# Patient Record
Sex: FEMALE | Race: OTHER | HISPANIC OR LATINO
[De-identification: names, ages, dates, MRNs, and addresses within clinical notes are randomized per-mention and may not be internally consistent; named-entity substitution may affect disease eponyms.]

---

## 2017-07-02 ENCOUNTER — RESULT REVIEW (OUTPATIENT)
Age: 32
End: 2017-07-02

## 2017-07-02 ENCOUNTER — INPATIENT (INPATIENT)
Facility: HOSPITAL | Age: 32
LOS: 0 days | Discharge: HOME | End: 2017-07-03
Attending: OBSTETRICS & GYNECOLOGY | Admitting: OBSTETRICS & GYNECOLOGY
Payer: MEDICAID

## 2017-07-02 VITALS
DIASTOLIC BLOOD PRESSURE: 65 MMHG | TEMPERATURE: 99 F | SYSTOLIC BLOOD PRESSURE: 110 MMHG | RESPIRATION RATE: 18 BRPM | HEART RATE: 98 BPM | OXYGEN SATURATION: 100 %

## 2017-07-02 DIAGNOSIS — O00.90 UNSPECIFIED ECTOPIC PREGNANCY WITHOUT INTRAUTERINE PREGNANCY: ICD-10-CM

## 2017-07-02 LAB
ALBUMIN SERPL ELPH-MCNC: 4.4 G/DL — SIGNIFICANT CHANGE UP (ref 3.3–5)
ALP SERPL-CCNC: 32 U/L — LOW (ref 40–120)
ALT FLD-CCNC: 10 U/L — SIGNIFICANT CHANGE UP (ref 4–33)
APTT BLD: 20.7 SEC — LOW (ref 27.5–37.4)
APTT BLD: 24.2 SEC — LOW (ref 27.5–37.4)
APTT BLD: 25.4 SEC — LOW (ref 27.5–37.4)
AST SERPL-CCNC: 12 U/L — SIGNIFICANT CHANGE UP (ref 4–32)
BASE EXCESS BLDA CALC-SCNC: -5.5 MMOL/L — SIGNIFICANT CHANGE UP
BASE EXCESS BLDA CALC-SCNC: -6.1 MMOL/L — SIGNIFICANT CHANGE UP
BASOPHILS # BLD AUTO: 0.01 K/UL — SIGNIFICANT CHANGE UP (ref 0–0.2)
BASOPHILS # BLD AUTO: 0.02 K/UL — SIGNIFICANT CHANGE UP (ref 0–0.2)
BASOPHILS # BLD AUTO: 0.03 K/UL — SIGNIFICANT CHANGE UP (ref 0–0.2)
BASOPHILS NFR BLD AUTO: 0.1 % — SIGNIFICANT CHANGE UP (ref 0–2)
BASOPHILS NFR BLD AUTO: 0.2 % — SIGNIFICANT CHANGE UP (ref 0–2)
BASOPHILS NFR BLD AUTO: 0.2 % — SIGNIFICANT CHANGE UP (ref 0–2)
BILIRUB SERPL-MCNC: 0.4 MG/DL — SIGNIFICANT CHANGE UP (ref 0.2–1.2)
BLD GP AB SCN SERPL QL: NEGATIVE — SIGNIFICANT CHANGE UP
BUN SERPL-MCNC: 10 MG/DL — SIGNIFICANT CHANGE UP (ref 7–23)
CA-I BLDA-SCNC: 1.11 MMOL/L — LOW (ref 1.15–1.29)
CALCIUM SERPL-MCNC: 9.2 MG/DL — SIGNIFICANT CHANGE UP (ref 8.4–10.5)
CHLORIDE SERPL-SCNC: 98 MMOL/L — SIGNIFICANT CHANGE UP (ref 98–107)
CO2 SERPL-SCNC: 20 MMOL/L — LOW (ref 22–31)
CREAT SERPL-MCNC: 0.69 MG/DL — SIGNIFICANT CHANGE UP (ref 0.5–1.3)
EOSINOPHIL # BLD AUTO: 0 K/UL — SIGNIFICANT CHANGE UP (ref 0–0.5)
EOSINOPHIL NFR BLD AUTO: 0 % — SIGNIFICANT CHANGE UP (ref 0–6)
FIBRINOGEN PPP-MCNC: 336 MG/DL — SIGNIFICANT CHANGE UP (ref 310–510)
FIBRINOGEN PPP-MCNC: 368 MG/DL — SIGNIFICANT CHANGE UP (ref 310–510)
GLUCOSE BLDA-MCNC: 136 MG/DL — HIGH (ref 70–99)
GLUCOSE BLDA-MCNC: 95 MG/DL — SIGNIFICANT CHANGE UP (ref 70–99)
GLUCOSE SERPL-MCNC: 110 MG/DL — HIGH (ref 70–99)
HCG SERPL-ACNC: SIGNIFICANT CHANGE UP MIU/ML
HCO3 BLDA-SCNC: 19 MMOL/L — LOW (ref 22–26)
HCO3 BLDA-SCNC: 20 MMOL/L — LOW (ref 22–26)
HCT VFR BLD CALC: 24.9 % — LOW (ref 34.5–45)
HCT VFR BLD CALC: 26.2 % — LOW (ref 34.5–45)
HCT VFR BLD CALC: 27.4 % — LOW (ref 34.5–45)
HCT VFR BLDA CALC: 17.3 % — CRITICAL LOW (ref 34.5–46.5)
HCT VFR BLDA CALC: 27.1 % — LOW (ref 34.5–46.5)
HGB BLD-MCNC: 8.5 G/DL — LOW (ref 11.5–15.5)
HGB BLD-MCNC: 9.1 G/DL — LOW (ref 11.5–15.5)
HGB BLD-MCNC: 9.6 G/DL — LOW (ref 11.5–15.5)
HGB BLDA-MCNC: 5.5 G/DL — CRITICAL LOW (ref 11.5–15.5)
HGB BLDA-MCNC: 8.7 G/DL — LOW (ref 11.5–15.5)
IMM GRANULOCYTES # BLD AUTO: 0.04 # — SIGNIFICANT CHANGE UP
IMM GRANULOCYTES # BLD AUTO: 0.06 # — SIGNIFICANT CHANGE UP
IMM GRANULOCYTES # BLD AUTO: 0.07 # — SIGNIFICANT CHANGE UP
IMM GRANULOCYTES NFR BLD AUTO: 0.4 % — SIGNIFICANT CHANGE UP (ref 0–1.5)
IMM GRANULOCYTES NFR BLD AUTO: 0.5 % — SIGNIFICANT CHANGE UP (ref 0–1.5)
IMM GRANULOCYTES NFR BLD AUTO: 0.7 % — SIGNIFICANT CHANGE UP (ref 0–1.5)
INR BLD: 1.31 — HIGH (ref 0.88–1.17)
INR BLD: 1.34 — HIGH (ref 0.88–1.17)
INR BLD: 1.37 — HIGH (ref 0.88–1.17)
LYMPHOCYTES # BLD AUTO: 0.68 K/UL — LOW (ref 1–3.3)
LYMPHOCYTES # BLD AUTO: 0.71 K/UL — LOW (ref 1–3.3)
LYMPHOCYTES # BLD AUTO: 1.77 K/UL — SIGNIFICANT CHANGE UP (ref 1–3.3)
LYMPHOCYTES # BLD AUTO: 14.4 % — SIGNIFICANT CHANGE UP (ref 13–44)
LYMPHOCYTES # BLD AUTO: 6.6 % — LOW (ref 13–44)
LYMPHOCYTES # BLD AUTO: 7.5 % — LOW (ref 13–44)
MCHC RBC-ENTMCNC: 29.7 PG — SIGNIFICANT CHANGE UP (ref 27–34)
MCHC RBC-ENTMCNC: 31 PG — SIGNIFICANT CHANGE UP (ref 27–34)
MCHC RBC-ENTMCNC: 31 PG — SIGNIFICANT CHANGE UP (ref 27–34)
MCHC RBC-ENTMCNC: 34.1 % — SIGNIFICANT CHANGE UP (ref 32–36)
MCHC RBC-ENTMCNC: 34.7 % — SIGNIFICANT CHANGE UP (ref 32–36)
MCHC RBC-ENTMCNC: 35 % — SIGNIFICANT CHANGE UP (ref 32–36)
MCV RBC AUTO: 87.1 FL — SIGNIFICANT CHANGE UP (ref 80–100)
MCV RBC AUTO: 88.4 FL — SIGNIFICANT CHANGE UP (ref 80–100)
MCV RBC AUTO: 89.1 FL — SIGNIFICANT CHANGE UP (ref 80–100)
MONOCYTES # BLD AUTO: 0.18 K/UL — SIGNIFICANT CHANGE UP (ref 0–0.9)
MONOCYTES # BLD AUTO: 0.2 K/UL — SIGNIFICANT CHANGE UP (ref 0–0.9)
MONOCYTES # BLD AUTO: 0.55 K/UL — SIGNIFICANT CHANGE UP (ref 0–0.9)
MONOCYTES NFR BLD AUTO: 1.8 % — LOW (ref 2–14)
MONOCYTES NFR BLD AUTO: 2.1 % — SIGNIFICANT CHANGE UP (ref 2–14)
MONOCYTES NFR BLD AUTO: 4.5 % — SIGNIFICANT CHANGE UP (ref 2–14)
NEUTROPHILS # BLD AUTO: 8.49 K/UL — HIGH (ref 1.8–7.4)
NEUTROPHILS # BLD AUTO: 9.31 K/UL — HIGH (ref 1.8–7.4)
NEUTROPHILS # BLD AUTO: 9.87 K/UL — HIGH (ref 1.8–7.4)
NEUTROPHILS NFR BLD AUTO: 80.4 % — HIGH (ref 43–77)
NEUTROPHILS NFR BLD AUTO: 89.9 % — HIGH (ref 43–77)
NEUTROPHILS NFR BLD AUTO: 90.7 % — HIGH (ref 43–77)
NRBC # FLD: 0 — SIGNIFICANT CHANGE UP
PCO2 BLDA: 38 MMHG — SIGNIFICANT CHANGE UP (ref 32–48)
PCO2 BLDA: 39 MMHG — SIGNIFICANT CHANGE UP (ref 32–48)
PH BLDA: 7.32 PH — LOW (ref 7.35–7.45)
PH BLDA: 7.32 PH — LOW (ref 7.35–7.45)
PLATELET # BLD AUTO: 113 K/UL — LOW (ref 150–400)
PLATELET # BLD AUTO: 121 K/UL — LOW (ref 150–400)
PLATELET # BLD AUTO: 195 K/UL — SIGNIFICANT CHANGE UP (ref 150–400)
PMV BLD: 12.9 FL — SIGNIFICANT CHANGE UP (ref 7–13)
PMV BLD: 12.9 FL — SIGNIFICANT CHANGE UP (ref 7–13)
PMV BLD: 13.5 FL — HIGH (ref 7–13)
PO2 BLDA: 227 MMHG — HIGH (ref 83–108)
PO2 BLDA: 270 MMHG — HIGH (ref 83–108)
POTASSIUM BLDA-SCNC: 3.3 MMOL/L — LOW (ref 3.4–4.5)
POTASSIUM BLDA-SCNC: 3.8 MMOL/L — SIGNIFICANT CHANGE UP (ref 3.4–4.5)
POTASSIUM SERPL-MCNC: 3.6 MMOL/L — SIGNIFICANT CHANGE UP (ref 3.5–5.3)
POTASSIUM SERPL-SCNC: 3.6 MMOL/L — SIGNIFICANT CHANGE UP (ref 3.5–5.3)
PROT SERPL-MCNC: 7.1 G/DL — SIGNIFICANT CHANGE UP (ref 6–8.3)
PROTHROM AB SERPL-ACNC: 14.8 SEC — HIGH (ref 9.8–13.1)
PROTHROM AB SERPL-ACNC: 15.1 SEC — HIGH (ref 9.8–13.1)
PROTHROM AB SERPL-ACNC: 15.4 SEC — HIGH (ref 9.8–13.1)
RBC # BLD: 2.86 M/UL — LOW (ref 3.8–5.2)
RBC # BLD: 2.94 M/UL — LOW (ref 3.8–5.2)
RBC # BLD: 3.1 M/UL — LOW (ref 3.8–5.2)
RBC # FLD: 12.8 % — SIGNIFICANT CHANGE UP (ref 10.3–14.5)
RBC # FLD: 13.7 % — SIGNIFICANT CHANGE UP (ref 10.3–14.5)
RBC # FLD: 13.7 % — SIGNIFICANT CHANGE UP (ref 10.3–14.5)
RH IG SCN BLD-IMP: POSITIVE — SIGNIFICANT CHANGE UP
RH IG SCN BLD-IMP: POSITIVE — SIGNIFICANT CHANGE UP
SAO2 % BLDA: 99.6 % — HIGH (ref 95–99)
SAO2 % BLDA: 99.7 % — HIGH (ref 95–99)
SODIUM BLDA-SCNC: 133 MMOL/L — LOW (ref 136–146)
SODIUM BLDA-SCNC: 135 MMOL/L — LOW (ref 136–146)
SODIUM SERPL-SCNC: 138 MMOL/L — SIGNIFICANT CHANGE UP (ref 135–145)
WBC # BLD: 10.26 K/UL — SIGNIFICANT CHANGE UP (ref 3.8–10.5)
WBC # BLD: 12.28 K/UL — HIGH (ref 3.8–10.5)
WBC # BLD: 9.45 K/UL — SIGNIFICANT CHANGE UP (ref 3.8–10.5)
WBC # FLD AUTO: 10.26 K/UL — SIGNIFICANT CHANGE UP (ref 3.8–10.5)
WBC # FLD AUTO: 12.28 K/UL — HIGH (ref 3.8–10.5)
WBC # FLD AUTO: 9.45 K/UL — SIGNIFICANT CHANGE UP (ref 3.8–10.5)

## 2017-07-02 PROCEDURE — 88305 TISSUE EXAM BY PATHOLOGIST: CPT | Mod: 26

## 2017-07-02 PROCEDURE — 76830 TRANSVAGINAL US NON-OB: CPT | Mod: 26

## 2017-07-02 PROCEDURE — 88304 TISSUE EXAM BY PATHOLOGIST: CPT | Mod: 26

## 2017-07-02 PROCEDURE — 99221 1ST HOSP IP/OBS SF/LOW 40: CPT

## 2017-07-02 RX ORDER — HYDROMORPHONE HYDROCHLORIDE 2 MG/ML
1 INJECTION INTRAMUSCULAR; INTRAVENOUS; SUBCUTANEOUS ONCE
Qty: 0 | Refills: 0 | Status: DISCONTINUED | OUTPATIENT
Start: 2017-07-02 | End: 2017-07-02

## 2017-07-02 RX ORDER — ACETAMINOPHEN 500 MG
650 TABLET ORAL EVERY 6 HOURS
Qty: 0 | Refills: 0 | Status: DISCONTINUED | OUTPATIENT
Start: 2017-07-02 | End: 2017-07-03

## 2017-07-02 RX ORDER — MORPHINE SULFATE 50 MG/1
2 CAPSULE, EXTENDED RELEASE ORAL ONCE
Qty: 0 | Refills: 0 | Status: DISCONTINUED | OUTPATIENT
Start: 2017-07-02 | End: 2017-07-02

## 2017-07-02 RX ORDER — IBUPROFEN 200 MG
600 TABLET ORAL EVERY 6 HOURS
Qty: 0 | Refills: 0 | Status: DISCONTINUED | OUTPATIENT
Start: 2017-07-02 | End: 2017-07-03

## 2017-07-02 RX ORDER — OXYCODONE HYDROCHLORIDE 5 MG/1
10 TABLET ORAL EVERY 4 HOURS
Qty: 0 | Refills: 0 | Status: DISCONTINUED | OUTPATIENT
Start: 2017-07-02 | End: 2017-07-02

## 2017-07-02 RX ORDER — OXYCODONE HYDROCHLORIDE 5 MG/1
1 TABLET ORAL
Qty: 20 | Refills: 0 | OUTPATIENT
Start: 2017-07-02

## 2017-07-02 RX ORDER — SODIUM CHLORIDE 9 MG/ML
1000 INJECTION INTRAMUSCULAR; INTRAVENOUS; SUBCUTANEOUS ONCE
Qty: 0 | Refills: 0 | Status: COMPLETED | OUTPATIENT
Start: 2017-07-02 | End: 2017-07-02

## 2017-07-02 RX ORDER — MORPHINE SULFATE 50 MG/1
4 CAPSULE, EXTENDED RELEASE ORAL ONCE
Qty: 0 | Refills: 0 | Status: DISCONTINUED | OUTPATIENT
Start: 2017-07-02 | End: 2017-07-02

## 2017-07-02 RX ORDER — OXYCODONE HYDROCHLORIDE 5 MG/1
5 TABLET ORAL EVERY 4 HOURS
Qty: 0 | Refills: 0 | Status: DISCONTINUED | OUTPATIENT
Start: 2017-07-02 | End: 2017-07-03

## 2017-07-02 RX ORDER — SODIUM CHLORIDE 9 MG/ML
1000 INJECTION INTRAMUSCULAR; INTRAVENOUS; SUBCUTANEOUS
Qty: 0 | Refills: 0 | Status: DISCONTINUED | OUTPATIENT
Start: 2017-07-02 | End: 2017-07-03

## 2017-07-02 RX ORDER — SODIUM CHLORIDE 9 MG/ML
1000 INJECTION, SOLUTION INTRAVENOUS
Qty: 0 | Refills: 0 | Status: DISCONTINUED | OUTPATIENT
Start: 2017-07-02 | End: 2017-07-03

## 2017-07-02 RX ORDER — OXYCODONE HYDROCHLORIDE 5 MG/1
5 TABLET ORAL EVERY 4 HOURS
Qty: 0 | Refills: 0 | Status: DISCONTINUED | OUTPATIENT
Start: 2017-07-02 | End: 2017-07-02

## 2017-07-02 RX ORDER — ACETAMINOPHEN 500 MG
1000 TABLET ORAL ONCE
Qty: 0 | Refills: 0 | Status: DISCONTINUED | OUTPATIENT
Start: 2017-07-02 | End: 2017-07-02

## 2017-07-02 RX ADMIN — SODIUM CHLORIDE 125 MILLILITER(S): 9 INJECTION, SOLUTION INTRAVENOUS at 21:39

## 2017-07-02 RX ADMIN — MORPHINE SULFATE 4 MILLIGRAM(S): 50 CAPSULE, EXTENDED RELEASE ORAL at 03:04

## 2017-07-02 RX ADMIN — SODIUM CHLORIDE 1000 MILLILITER(S): 9 INJECTION INTRAMUSCULAR; INTRAVENOUS; SUBCUTANEOUS at 03:12

## 2017-07-02 RX ADMIN — Medication 600 MILLIGRAM(S): at 22:30

## 2017-07-02 RX ADMIN — SODIUM CHLORIDE 1000 MILLILITER(S): 9 INJECTION INTRAMUSCULAR; INTRAVENOUS; SUBCUTANEOUS at 04:41

## 2017-07-02 RX ADMIN — Medication 600 MILLIGRAM(S): at 21:39

## 2017-07-02 RX ADMIN — MORPHINE SULFATE 4 MILLIGRAM(S): 50 CAPSULE, EXTENDED RELEASE ORAL at 03:18

## 2017-07-02 NOTE — H&P ADULT - NSHPPHYSICALEXAM_GEN_ALL_CORE
ICU Vital Signs Last 24 Hrs  T(C): 37.3 (02 Jul 2017 01:55), Max: 37.3 (02 Jul 2017 01:55)  T(F): 99.2 (02 Jul 2017 01:55), Max: 99.2 (02 Jul 2017 01:55)  HR: 94 (02 Jul 2017 02:38) (94 - 98)  BP: 103/79 (02 Jul 2017 03:59) (93/61 - 110/65)  BP(mean): --  ABP: --  ABP(mean): --  RR: 18 (02 Jul 2017 02:38) (18 - 18)  SpO2: 100% (02 Jul 2017 02:38) (100% - 100%)    Gen: NAD  Abd: soft, tender to palpation, +rebound +guarding  Ext: NTBL

## 2017-07-02 NOTE — BRIEF OPERATIVE NOTE - POST-OP DX
Ectopic pregnancy  07/02/2017    Active  Shannon Matta
Ectopic pregnancy  07/02/2017    Active  Shannon Matta

## 2017-07-02 NOTE — H&P ADULT - PROBLEM SELECTOR PLAN 1
admit to GYN  preop labs  NPO  Rh+, no need for Rhogam  2 units pRBC on hold   add on to OR schedule for emergent LSC R salpingectomy    Pt seen w/ Dr. Oskar Matta MD PGY4

## 2017-07-02 NOTE — BRIEF OPERATIVE NOTE - OPERATION/FINDINGS
Intra-operative consult:  Diagnostic laparoscopic, evacuation of hematoma, no obvious bleeding.
ruptured left ectopic pregnancy  adhesions between ectopic pregnancy and omentum  adhesions between omentum and anterior abdominal wall  hemoperitoneum 2100cc

## 2017-07-02 NOTE — BRIEF OPERATIVE NOTE - PROCEDURE
Laparoscopy  07/02/2017    Active  Shannon Matta
Lysis of adhesions  07/02/2017    Active  STONICK  Laparoscopic left salpingectomy for ectopic pregnancy  07/02/2017    Active  STONICK  Laparoscopy  07/02/2017    Active  STONICK

## 2017-07-02 NOTE — PROGRESS NOTE ADULT - PROBLEM SELECTOR PLAN 1
1. Neuro: Analgesia PRN. acetaminophen  IVPB. 1000 milliGRAM(s) IV Intermittent once  HYDROmorphone  Injectable 1 milliGRAM(s) IV Push once  HYDROmorphone  Injectable 1 milliGRAM(s) IV Push once PRN     2. CV: Hemodynamically stable.  Monitor VS. Hct stable  3. Pulm: Saturating well on room air.  Encourage OOB and incentive spirometer use. Continue   4. GI: Advance to regular diet. Anti-emetics PRN.  5. : s/p void  6. Electrolytes: SLIV  7. DVT ppx w/ SCD's while in bed. Early ambulation, initially with assistance then as tolerated.    8. Discharge from PACU when criteria met.

## 2017-07-02 NOTE — ED ADULT TRIAGE NOTE - CHIEF COMPLAINT QUOTE
preg- vag bleed    approx 8 weeks preg as per pt...c/o pelvic pain and vag spotting. has not had sono yet...only +preg test

## 2017-07-02 NOTE — ED ADULT NURSE NOTE - OBJECTIVE STATEMENT
Break coverage RN received pt to room 6 A&Ox4 c/o being approx 8 weeks pregnant with vag bleeding, N/V, and abdominal cramping since this afternoon. Pt reports "pressure to umbilical/perineal area, noted to be uncomfortable on assessment. Pt reports this is 10th pregnancy, has had 5 live births, 2 abortions, and 2 miscarriages. IV placed, labs sent, VS as documented, will reassess.

## 2017-07-02 NOTE — ED PROVIDER NOTE - OBJECTIVE STATEMENT
32 year old female  8 weeks pregnant in with abdominal pain and vaginal bleeding, states started out with spotting, now says more severe bleeding, cannot characterize amount of pads.  Also with dysuria and pelvic pressure. Ha not had sono for this pregnancy yet. 32 year old female  8 weeks pregnant in with abdominal pain and vaginal bleeding, states started out with spotting, now says more severe bleeding, cannot characterize amount of pads.  Also with dysuria and pelvic pressure. Has not had sono for this pregnancy yet.

## 2017-07-02 NOTE — PROGRESS NOTE ADULT - SUBJECTIVE AND OBJECTIVE BOX
R2 OBGYN POST-OP CHECK  SUDHA PETERSON32y    Patient seen and evaluated at bedside.  Pt awake and alert resting comfortaby in bed.  Patient reports pain controlled with analgesia. Pt denies N/V, SOB, CP, palpitations, fever/chills. Tolerating clears.  Not OOB yet. Pt has not passed flatus, pt s/p void.     T(C): 36.9 (07-02-17 @ 15:00), Max: 36.9 (07-02-17 @ 14:00)  HR: 52 (07-02-17 @ 15:00) (52 - 60)  BP: 96/59 (07-02-17 @ 15:00) (95/54 - 100/66)  RR: 16 (07-02-17 @ 15:00) (15 - 16)  SpO2: 98% (07-02-17 @ 15:00) (98% - 99%)  Wt(kg): --  I&O's Summary    02 Jul 2017 07:01  -  02 Jul 2017 16:05  --------------------------------------------------------  IN: 905 mL / OUT: 865 mL / NET: 40 mL        Gen: Resting comfortably in bed, NAD  CV: S1S2, RRR  Lungs: CTA B/L  Abd: soft, appropriately tender, occasional BS x 4 quadrants.    Inc: Clean/dry/intact w/ bandage in place (3 port sites)  Ext: SCD's in place and functional, non-tender b/l, no edema    Labs:   07-02 @ 12:55:   9.45 > 8.5 / 24.9 < 113  07-02 @ 08:15:   10.26 > 9.1 / 26.2 < 121  07-02 @ 02:28:   12.28 > 9.6 / 27.4 < 195

## 2017-07-02 NOTE — ED PROVIDER NOTE - PHYSICAL EXAMINATION
pelvic exam chaperoned by Dr. Whatley- severe right adnexal tenderness, mild oozing of blood from cervix, cervix closed.  FAST exam shows free fluid in RUQ

## 2017-07-02 NOTE — H&P ADULT - NSHPLABSRESULTS_GEN_ALL_CORE
FAST scan + free fluid  Abd sono: ruptured right ectopic pregnancy (prelim)                          9.6    12.28 )-----------( 195      ( 02 Jul 2017 02:28 )             27.4   07-02    138  |  98  |  10  ----------------------------<  110<H>  3.6   |  20<L>  |  0.69    Ca    9.2      02 Jul 2017 02:28    TPro  7.1  /  Alb  4.4  /  TBili  0.4  /  DBili  x   /  AST  12  /  ALT  10  /  AlkPhos  32<L>  07-02  HCG Quantitative, Serum (07.02.17 @ 02:28)    HCG Quantitative, Serum: 38998: REFERENCE RANGE  GROUP                   HCG-QUANT mIU/mL  ------------------      ----------------  Male:                   Less than 5  Non-pregnant females:   Less than 5  Indeterminant females:            5 - 25    Weeks of pregnancy:           [id number]: U/mL  ------------------            -------  3                     6 -      71  4                    10 -     750  5                   220 -   7,100  6                   160 -  32,000  7                 3,700 - 164,000  8                32,000 - 150,703411: 00  9                64,000 - 151,000  10                47,000 - 187,000  12                28,000 - 211,000  14                14,000 -  63,000  15                12,000 -  71,000  16 - 18            8,000 -  58,000 mIU/mL

## 2017-07-02 NOTE — ED PROVIDER NOTE - ATTENDING CONTRIBUTION TO CARE
I, Mayi Gill M.D. have examined the patient and confirmed the essential components of the history, physical examination, diagnosis, and treatment plan. I agree with the patient's care as documented by the resident and amended herein by me. See note above for complete details of service.  31 yo F ~ 8 weeks pregnant with no prior US/PNC who pw progressive right lower abdominal pain and vaginal bleeding. Exam reveals significant R adnexal ttp and mild vaginal bleeding. Bedside US reveals fluid in Pitts's pouch. Pt hydrated, pain controlled and monitored closely. GYN cs placed for likely ruptured ectopic. Pt went for US (in ED rm 21) which confirmed ruptured R ectopic. Pt to OR for surgical mgmt. Pt aware of diagnosis and tx plan. Agrees with plan.

## 2017-07-02 NOTE — H&P ADULT - HISTORY OF PRESENT ILLNESS
31 yo  LMP 17 presents with abdominal pain. Pt began to have pain at 2:30pm, worsened overnight. Pt also having heavy vaginal bleeding at home and vomiting. Pt does not yet have prenatal care this pregnancy.    OBH:   2003 LTCS   2010   2011   2013   2015   2 SAB  2 TOP (1 D&C, 1 medication )    GYN: HPV  PMSH: c/s  All: NKDA  Med: none  Pt accepts blood products

## 2017-07-03 VITALS
DIASTOLIC BLOOD PRESSURE: 62 MMHG | TEMPERATURE: 98 F | HEART RATE: 66 BPM | OXYGEN SATURATION: 100 % | SYSTOLIC BLOOD PRESSURE: 95 MMHG | RESPIRATION RATE: 18 BRPM

## 2017-07-03 DIAGNOSIS — Z98.890 OTHER SPECIFIED POSTPROCEDURAL STATES: ICD-10-CM

## 2017-07-03 PROBLEM — Z00.00 ENCOUNTER FOR PREVENTIVE HEALTH EXAMINATION: Status: ACTIVE | Noted: 2017-07-03

## 2017-07-03 LAB
BASOPHILS # BLD AUTO: 0.01 K/UL — SIGNIFICANT CHANGE UP (ref 0–0.2)
BASOPHILS NFR BLD AUTO: 0.1 % — SIGNIFICANT CHANGE UP (ref 0–2)
EOSINOPHIL # BLD AUTO: 0.04 K/UL — SIGNIFICANT CHANGE UP (ref 0–0.5)
EOSINOPHIL NFR BLD AUTO: 0.6 % — SIGNIFICANT CHANGE UP (ref 0–6)
HCT VFR BLD CALC: 21.8 % — LOW (ref 34.5–45)
HCT VFR BLD CALC: 21.9 % — LOW (ref 34.5–45)
HGB BLD-MCNC: 7.5 G/DL — LOW (ref 11.5–15.5)
HGB BLD-MCNC: 7.6 G/DL — LOW (ref 11.5–15.5)
IMM GRANULOCYTES # BLD AUTO: 0.02 # — SIGNIFICANT CHANGE UP
IMM GRANULOCYTES NFR BLD AUTO: 0.3 % — SIGNIFICANT CHANGE UP (ref 0–1.5)
LYMPHOCYTES # BLD AUTO: 2.35 K/UL — SIGNIFICANT CHANGE UP (ref 1–3.3)
LYMPHOCYTES # BLD AUTO: 34.9 % — SIGNIFICANT CHANGE UP (ref 13–44)
MCHC RBC-ENTMCNC: 30.6 PG — SIGNIFICANT CHANGE UP (ref 27–34)
MCHC RBC-ENTMCNC: 31.3 PG — SIGNIFICANT CHANGE UP (ref 27–34)
MCHC RBC-ENTMCNC: 34.2 % — SIGNIFICANT CHANGE UP (ref 32–36)
MCHC RBC-ENTMCNC: 34.9 % — SIGNIFICANT CHANGE UP (ref 32–36)
MCV RBC AUTO: 89.4 FL — SIGNIFICANT CHANGE UP (ref 80–100)
MCV RBC AUTO: 89.7 FL — SIGNIFICANT CHANGE UP (ref 80–100)
MONOCYTES # BLD AUTO: 0.4 K/UL — SIGNIFICANT CHANGE UP (ref 0–0.9)
MONOCYTES NFR BLD AUTO: 5.9 % — SIGNIFICANT CHANGE UP (ref 2–14)
NEUTROPHILS # BLD AUTO: 3.91 K/UL — SIGNIFICANT CHANGE UP (ref 1.8–7.4)
NEUTROPHILS NFR BLD AUTO: 58.2 % — SIGNIFICANT CHANGE UP (ref 43–77)
NRBC # FLD: 0 — SIGNIFICANT CHANGE UP
NRBC # FLD: 0 — SIGNIFICANT CHANGE UP
PLATELET # BLD AUTO: 104 K/UL — LOW (ref 150–400)
PLATELET # BLD AUTO: 111 K/UL — LOW (ref 150–400)
PMV BLD: 13.1 FL — HIGH (ref 7–13)
PMV BLD: 13.6 FL — HIGH (ref 7–13)
RBC # BLD: 2.43 M/UL — LOW (ref 3.8–5.2)
RBC # BLD: 2.45 M/UL — LOW (ref 3.8–5.2)
RBC # FLD: 14 % — SIGNIFICANT CHANGE UP (ref 10.3–14.5)
RBC # FLD: 14 % — SIGNIFICANT CHANGE UP (ref 10.3–14.5)
WBC # BLD: 6.73 K/UL — SIGNIFICANT CHANGE UP (ref 3.8–10.5)
WBC # BLD: 7.6 K/UL — SIGNIFICANT CHANGE UP (ref 3.8–10.5)
WBC # FLD AUTO: 6.73 K/UL — SIGNIFICANT CHANGE UP (ref 3.8–10.5)
WBC # FLD AUTO: 7.6 K/UL — SIGNIFICANT CHANGE UP (ref 3.8–10.5)

## 2017-07-03 RX ORDER — DOCUSATE SODIUM 100 MG
100 CAPSULE ORAL DAILY
Qty: 0 | Refills: 0 | Status: DISCONTINUED | OUTPATIENT
Start: 2017-07-03 | End: 2017-07-03

## 2017-07-03 RX ORDER — ASCORBIC ACID 60 MG
500 TABLET,CHEWABLE ORAL DAILY
Qty: 0 | Refills: 0 | Status: DISCONTINUED | OUTPATIENT
Start: 2017-07-03 | End: 2017-07-03

## 2017-07-03 RX ORDER — FERROUS SULFATE 325(65) MG
1 TABLET ORAL
Qty: 90 | Refills: 0 | OUTPATIENT
Start: 2017-07-03 | End: 2017-08-02

## 2017-07-03 RX ORDER — ASCORBIC ACID 60 MG
1 TABLET,CHEWABLE ORAL
Qty: 30 | Refills: 0 | OUTPATIENT
Start: 2017-07-03 | End: 2017-08-02

## 2017-07-03 RX ORDER — ACETAMINOPHEN 500 MG
2 TABLET ORAL
Qty: 0 | Refills: 0 | COMMUNITY
Start: 2017-07-03

## 2017-07-03 RX ORDER — IBUPROFEN 200 MG
1 TABLET ORAL
Qty: 0 | Refills: 0 | COMMUNITY
Start: 2017-07-03

## 2017-07-03 RX ORDER — FERROUS SULFATE 325(65) MG
325 TABLET ORAL
Qty: 0 | Refills: 0 | Status: DISCONTINUED | OUTPATIENT
Start: 2017-07-03 | End: 2017-07-03

## 2017-07-03 RX ADMIN — Medication 325 MILLIGRAM(S): at 09:33

## 2017-07-03 NOTE — PROGRESS NOTE ADULT - ATTENDING COMMENTS
reviewed with patient discharge instructions and H/H was stable reviewed with patient discharge instructions and H/H was stable so patient was ready to be discharged

## 2017-07-03 NOTE — DISCHARGE NOTE ADULT - HOSPITAL COURSE
The patient was admitted for due to ruptured ectopic pregnancy. Pt had a Laparoscopic Left Salpingectomy, Lysis of Adhesions, evacuation of 2100 hemoperitoneum.  See operative note for details Pt received 2 units of PRBCS intraop. ON POD#1 pt had a drop in hematocrit due to acute blood loss 24.9->21.9 H/H was repeated and was stable. 21.9-> 21.8.  The patient reached all post-operative milestones. The patient is voiding spontaneously, tolerating regular diet, ambulating, afebrile and has stable vital signs prior to discharge

## 2017-07-03 NOTE — PROGRESS NOTE ADULT - PROBLEM SELECTOR PLAN 1
1. CVS: hemodynamically stable, f/u AM CBC   2. Pulm: no acute issue, Incentive Spirometry use encouraged  3. GI: Continue Regular Diet   4. : adequate UOP   5. Heme: venodynes for DVT Prophylaxis   6. Neuro: Analgesia PRN  7. Dispo: continue current management

## 2017-07-03 NOTE — PROGRESS NOTE ADULT - SUBJECTIVE AND OBJECTIVE BOX
DICK Progress note    S: Pt seen and examined at bedside.  Pt is ambulating. Pt is tolerating regular diet. Passing flatus. Pt denies fever, chills, chest pain, SOB, nausea, vomiting, lightheadedness, dizziness, LE pain.      O: T(F): 98.1 (07-03-17 @ 05:36), Max: 99 (07-02-17 @ 21:01)  HR: 72 (07-03-17 @ 05:36) (52 - 79)  BP: 101/58 (07-03-17 @ 05:36) (89/52 - 114/72)  RR: 18 (07-03-17 @ 05:36) (14 - 18)  SpO2: 100% (07-03-17 @ 05:36) (97% - 100%)  Wt(kg): --  I&O's Summary    02 Jul 2017 07:01  -  03 Jul 2017 06:59  --------------------------------------------------------  IN: 1905 mL / OUT: 2165 mL / NET: -260 mL      PHYSICAL EXAM:    Gen: NAD, alert and oriented x 3  Cardiovascular: regular rate and rhythm, S1 and S2 present   Respiratory: CTAB  Abd: Soft, non-tender, non-distended, no guarding, no rebound, + bowel sounds. Dressings removed from incision port sites, steri-strips in place.   Pelvic: Deferred  Ext: No tenderness b/l.         MEDICATIONS  (STANDING):  sodium chloride 0.9%. 1000 milliLiter(s) (125 mL/Hr) IV Continuous <Continuous>  lactated ringers. 1000 milliLiter(s) (125 mL/Hr) IV Continuous <Continuous>  ibuprofen  Tablet 600 milliGRAM(s) Oral every 6 hours    MEDICATIONS  (PRN):  acetaminophen   Tablet. 650 milliGRAM(s) Oral every 6 hours PRN Moderate Pain (4 - 6)  oxyCODONE IR 5 milliGRAM(s) Oral every 4 hours PRN Severe Pain (7 - 10)      LABS:                        7.5    7.60  )-----------( 104      ( 03 Jul 2017 05:10 )             21.9     07-02    138  |  98  |  10  ----------------------------<  110<H>  3.6   |  20<L>  |  0.69    Ca    9.2      02 Jul 2017 02:28    TPro  7.1  /  Alb  4.4  /  TBili  0.4  /  DBili  x   /  AST  12  /  ALT  10  /  AlkPhos  32<L>  07-02    PT/INR - ( 02 Jul 2017 12:25 )   PT: 14.8 SEC;   INR: 1.31          PTT - ( 02 Jul 2017 12:25 )  PTT:20.7 SEC      RADIOLOGY & ADDITIONAL TESTS:

## 2017-07-03 NOTE — PROGRESS NOTE ADULT - SUBJECTIVE AND OBJECTIVE BOX
OBGYJONES POD#   Note    S: Pt seen and examined at bedside. Pt states mild abdominal pain. Pt [ ] ambulating, tolerating ___ diet, [ ] flatus, [ ]BM, [ ] urinating adequately. Pt denies fever, chills, chest pain, SOB, nausea, vomiting, lightheadedness, dizziness.      O: T(F): 98.1 (07-03-17 @ 05:36), Max: 99 (07-02-17 @ 21:01)  HR: 72 (07-03-17 @ 05:36) (52 - 79)  BP: 101/58 (07-03-17 @ 05:36) (89/52 - 114/72)  RR: 18 (07-03-17 @ 05:36) (14 - 18)  SpO2: 100% (07-03-17 @ 05:36) (97% - 100%)  Wt(kg): --  I&O's Summary    02 Jul 2017 07:01  -  03 Jul 2017 06:48  --------------------------------------------------------  IN: 1905 mL / OUT: 2165 mL / NET: -260 mL      PHYSICAL EXAM:    Gen: NAD, alert and oriented x 3  Cardiovascular: regular rate and rhythm, S1 and S2 present   Respiratory: CTAB  Abd: Soft, mildly tender, [ ] distended, no guarding, no rebound, [ ] bowel sounds  Incision: clean, dry, intact  Pelvic: Deferred   Extremities: NTBL, venodynes on and functioning           MEDICATIONS  (STANDING):  sodium chloride 0.9%. 1000 milliLiter(s) (125 mL/Hr) IV Continuous <Continuous>  lactated ringers. 1000 milliLiter(s) (125 mL/Hr) IV Continuous <Continuous>  ibuprofen  Tablet 600 milliGRAM(s) Oral every 6 hours    MEDICATIONS  (PRN):  acetaminophen   Tablet. 650 milliGRAM(s) Oral every 6 hours PRN Moderate Pain (4 - 6)  oxyCODONE IR 5 milliGRAM(s) Oral every 4 hours PRN Severe Pain (7 - 10)      LABS:                        7.5    7.60  )-----------( 104      ( 03 Jul 2017 05:10 )             21.9     07-02    138  |  98  |  10  ----------------------------<  110<H>  3.6   |  20<L>  |  0.69    Ca    9.2      02 Jul 2017 02:28    TPro  7.1  /  Alb  4.4  /  TBili  0.4  /  DBili  x   /  AST  12  /  ALT  10  /  AlkPhos  32<L>  07-02    PT/INR - ( 02 Jul 2017 12:25 )   PT: 14.8 SEC;   INR: 1.31          PTT - ( 02 Jul 2017 12:25 )  PTT:20.7 SEC      RADIOLOGY & ADDITIONAL TESTS:

## 2017-07-03 NOTE — PROVIDER CONTACT NOTE (OTHER) - ASSESSMENT
pt asymptomatic, LR infusing at 125ml/hr. denies dizziness/lightheadedness, no s/s of acute distress noted

## 2017-07-03 NOTE — PROGRESS NOTE ADULT - PROBLEM SELECTOR PLAN 1
1. CVS: VS wnl and continue to monitor VS   2. Pulm: no acute issue, Incentive Spirometry use encouraged  3. GI: Continue Regular Diet   4. : adequate UOP   5. Heme: early ambulation for DVT prophylaxis   6. Neuro: Analgesia PRN  7. Dispo: will consider d/c if pt remains asymptomatic for anemia

## 2017-07-03 NOTE — DISCHARGE NOTE ADULT - INSTRUCTIONS
call md office if having temperature above 101,if not tolerating diet ,if having nausea or vomitting ,if incision site looks red ,swollen or discharge noted ,if having trouble urinating

## 2017-07-03 NOTE — DISCHARGE NOTE ADULT - MEDICATION SUMMARY - MEDICATIONS TO TAKE
I will START or STAY ON the medications listed below when I get home from the hospital:    oxyCODONE 5 mg oral tablet  -- 1 tab(s) by mouth every 6 hours, As Needed MDD:4 tabs  -- Caution federal law prohibits the transfer of this drug to any person other  than the person for whom it was prescribed.  It is very important that you take or use this exactly as directed.  Do not skip doses or discontinue unless directed by your doctor.  May cause drowsiness.  Alcohol may intensify this effect.  Use care when operating dangerous machinery.  This prescription cannot be refilled.  Using more of this medication than prescribed may cause serious breathing problems.    -- Indication: For Pain    ibuprofen 600 mg oral tablet  -- 1 tab(s) by mouth every 6 hours  -- Indication: For Pain    acetaminophen 325 mg oral tablet  -- 2 tab(s) by mouth every 6 hours, As needed, Moderate Pain (4 - 6)  -- Indication: For  pain    ferrous sulfate 325 mg oral tablet  -- 1 tab(s) by mouth 3 times a day  -- Check with your doctor before becoming pregnant.  Do not chew, break, or crush.  May discolor urine or feces.    -- Indication: For anemia    ascorbic acid 500 mg oral tablet  -- 1 tab(s) by mouth once a day  -- Indication: For anemia

## 2017-07-03 NOTE — DISCHARGE NOTE ADULT - CARE PROVIDER_API CALL
Bree Cooley), Obstetrics and Gynecology  [address] [place] [address]  Phone: [phone]  Fax: [phone]

## 2017-07-03 NOTE — DISCHARGE NOTE ADULT - CONDITIONS AT DISCHARGE
VSS, denies any distress,tolerating diet ,voiding without trouble ,lap site dry and intact ,iv discontinued,discharge instructions given pt verbalise understanding it

## 2017-07-03 NOTE — DISCHARGE NOTE ADULT - PATIENT PORTAL LINK FT
“You can access the [facility] Patient Portal, offered by [facility], by registering with the following website: [email or web address]”

## 2017-07-03 NOTE — DISCHARGE NOTE ADULT - CARE PLAN
Principal Discharge DX:	Ruptured ectopic pregnancy  Goal:	return to baseline  Instructions for follow-up, activity and diet:	Regular Diet, Activity as tolerated Call [phone] for 2 weeks postop appt. Principal Discharge DX:	Ruptured ectopic pregnancy  Goal:	return to baseline  Instructions for follow-up, activity and diet:	Regular Diet, Activity as tolerated Call [phone] for 2 weeks postop appt.

## 2017-07-04 ENCOUNTER — TRANSCRIPTION ENCOUNTER (OUTPATIENT)
Age: 32
End: 2017-07-04

## 2017-07-05 ENCOUNTER — TRANSCRIPTION ENCOUNTER (OUTPATIENT)
Age: 32
End: 2017-07-05

## 2017-07-18 ENCOUNTER — APPOINTMENT (OUTPATIENT)
Dept: OBGYN | Facility: HOSPITAL | Age: 32
End: 2017-07-18

## 2017-07-18 ENCOUNTER — OUTPATIENT (OUTPATIENT)
Dept: OUTPATIENT SERVICES | Facility: HOSPITAL | Age: 32
LOS: 1 days | End: 2017-07-18

## 2017-07-18 ENCOUNTER — LABORATORY RESULT (OUTPATIENT)
Age: 32
End: 2017-07-18

## 2017-07-18 VITALS
BODY MASS INDEX: 23.22 KG/M2 | HEIGHT: 61 IN | SYSTOLIC BLOOD PRESSURE: 87 MMHG | WEIGHT: 123 LBS | HEART RATE: 86 BPM | DIASTOLIC BLOOD PRESSURE: 53 MMHG

## 2017-07-18 DIAGNOSIS — Z09 ENCOUNTER FOR FOLLOW-UP EXAMINATION AFTER COMPLETED TREATMENT FOR CONDITIONS OTHER THAN MALIGNANT NEOPLASM: ICD-10-CM

## 2017-07-18 DIAGNOSIS — R51 HEADACHE: ICD-10-CM

## 2017-07-18 LAB
BASOPHILS # BLD AUTO: 0.06 K/UL — SIGNIFICANT CHANGE UP (ref 0–0.2)
BASOPHILS NFR BLD AUTO: 0.9 % — SIGNIFICANT CHANGE UP (ref 0–2)
EOSINOPHIL # BLD AUTO: 0.2 K/UL — SIGNIFICANT CHANGE UP (ref 0–0.5)
EOSINOPHIL NFR BLD AUTO: 3 % — SIGNIFICANT CHANGE UP (ref 0–6)
HCT VFR BLD CALC: 30 % — LOW (ref 34.5–45)
HGB BLD-MCNC: 9.8 G/DL — LOW (ref 11.5–15.5)
IMM GRANULOCYTES # BLD AUTO: 0.01 # — SIGNIFICANT CHANGE UP
IMM GRANULOCYTES NFR BLD AUTO: 0.2 % — SIGNIFICANT CHANGE UP (ref 0–1.5)
LYMPHOCYTES # BLD AUTO: 2.18 K/UL — SIGNIFICANT CHANGE UP (ref 1–3.3)
LYMPHOCYTES # BLD AUTO: 33.2 % — SIGNIFICANT CHANGE UP (ref 13–44)
MCHC RBC-ENTMCNC: 31 PG — SIGNIFICANT CHANGE UP (ref 27–34)
MCHC RBC-ENTMCNC: 32.7 % — SIGNIFICANT CHANGE UP (ref 32–36)
MCV RBC AUTO: 94.9 FL — SIGNIFICANT CHANGE UP (ref 80–100)
MONOCYTES # BLD AUTO: 0.34 K/UL — SIGNIFICANT CHANGE UP (ref 0–0.9)
MONOCYTES NFR BLD AUTO: 5.2 % — SIGNIFICANT CHANGE UP (ref 2–14)
NEUTROPHILS # BLD AUTO: 3.78 K/UL — SIGNIFICANT CHANGE UP (ref 1.8–7.4)
NEUTROPHILS NFR BLD AUTO: 57.5 % — SIGNIFICANT CHANGE UP (ref 43–77)
NRBC # FLD: 0 — SIGNIFICANT CHANGE UP
PLATELET # BLD AUTO: 299 K/UL — SIGNIFICANT CHANGE UP (ref 150–400)
PMV BLD: 11.8 FL — SIGNIFICANT CHANGE UP (ref 7–13)
RBC # BLD: 3.16 M/UL — LOW (ref 3.8–5.2)
RBC # FLD: 13.7 % — SIGNIFICANT CHANGE UP (ref 10.3–14.5)
WBC # BLD: 6.57 K/UL — SIGNIFICANT CHANGE UP (ref 3.8–10.5)
WBC # FLD AUTO: 6.57 K/UL — SIGNIFICANT CHANGE UP (ref 3.8–10.5)

## 2017-07-19 ENCOUNTER — TRANSCRIPTION ENCOUNTER (OUTPATIENT)
Age: 32
End: 2017-07-19

## 2017-07-19 DIAGNOSIS — Z09 ENCOUNTER FOR FOLLOW-UP EXAMINATION AFTER COMPLETED TREATMENT FOR CONDITIONS OTHER THAN MALIGNANT NEOPLASM: ICD-10-CM

## 2017-07-24 ENCOUNTER — CHART COPY (OUTPATIENT)
Age: 32
End: 2017-07-24

## 2017-09-21 ENCOUNTER — EMERGENCY (EMERGENCY)
Facility: HOSPITAL | Age: 32
LOS: 1 days | Discharge: HOME | End: 2017-09-21
Attending: EMERGENCY MEDICINE | Admitting: EMERGENCY MEDICINE
Payer: MEDICAID

## 2017-09-21 VITALS
OXYGEN SATURATION: 100 % | DIASTOLIC BLOOD PRESSURE: 106 MMHG | TEMPERATURE: 99 F | RESPIRATION RATE: 20 BRPM | SYSTOLIC BLOOD PRESSURE: 125 MMHG | HEART RATE: 86 BPM

## 2017-09-21 VITALS
SYSTOLIC BLOOD PRESSURE: 115 MMHG | DIASTOLIC BLOOD PRESSURE: 64 MMHG | OXYGEN SATURATION: 100 % | HEART RATE: 92 BPM | RESPIRATION RATE: 18 BRPM

## 2017-09-21 DIAGNOSIS — Z90.79 ACQUIRED ABSENCE OF OTHER GENITAL ORGAN(S): Chronic | ICD-10-CM

## 2017-09-21 DIAGNOSIS — Z98.891 HISTORY OF UTERINE SCAR FROM PREVIOUS SURGERY: Chronic | ICD-10-CM

## 2017-09-21 LAB
ALBUMIN SERPL ELPH-MCNC: 4.7 G/DL — SIGNIFICANT CHANGE UP (ref 3.3–5)
ALP SERPL-CCNC: 38 U/L — LOW (ref 40–120)
ALT FLD-CCNC: 26 U/L — SIGNIFICANT CHANGE UP (ref 4–33)
APPEARANCE UR: CLEAR — SIGNIFICANT CHANGE UP
AST SERPL-CCNC: 25 U/L — SIGNIFICANT CHANGE UP (ref 4–32)
BACTERIA # UR AUTO: SIGNIFICANT CHANGE UP
BASOPHILS # BLD AUTO: 0.02 K/UL — SIGNIFICANT CHANGE UP (ref 0–0.2)
BASOPHILS NFR BLD AUTO: 0.2 % — SIGNIFICANT CHANGE UP (ref 0–2)
BILIRUB SERPL-MCNC: 0.3 MG/DL — SIGNIFICANT CHANGE UP (ref 0.2–1.2)
BILIRUB UR-MCNC: NEGATIVE — SIGNIFICANT CHANGE UP
BLOOD UR QL VISUAL: NEGATIVE — SIGNIFICANT CHANGE UP
BUN SERPL-MCNC: 7 MG/DL — SIGNIFICANT CHANGE UP (ref 7–23)
CALCIUM SERPL-MCNC: 9.2 MG/DL — SIGNIFICANT CHANGE UP (ref 8.4–10.5)
CHLORIDE SERPL-SCNC: 103 MMOL/L — SIGNIFICANT CHANGE UP (ref 98–107)
CO2 SERPL-SCNC: 17 MMOL/L — LOW (ref 22–31)
COLOR SPEC: YELLOW — SIGNIFICANT CHANGE UP
CREAT SERPL-MCNC: 0.6 MG/DL — SIGNIFICANT CHANGE UP (ref 0.5–1.3)
EOSINOPHIL # BLD AUTO: 0 K/UL — SIGNIFICANT CHANGE UP (ref 0–0.5)
EOSINOPHIL NFR BLD AUTO: 0 % — SIGNIFICANT CHANGE UP (ref 0–6)
GLUCOSE SERPL-MCNC: 128 MG/DL — HIGH (ref 70–99)
GLUCOSE UR-MCNC: NEGATIVE — SIGNIFICANT CHANGE UP
HCG SERPL-ACNC: SIGNIFICANT CHANGE UP MIU/ML
HCT VFR BLD CALC: 34.4 % — LOW (ref 34.5–45)
HGB BLD-MCNC: 12.4 G/DL — SIGNIFICANT CHANGE UP (ref 11.5–15.5)
IMM GRANULOCYTES # BLD AUTO: 0.03 # — SIGNIFICANT CHANGE UP
IMM GRANULOCYTES NFR BLD AUTO: 0.3 % — SIGNIFICANT CHANGE UP (ref 0–1.5)
KETONES UR-MCNC: SIGNIFICANT CHANGE UP
LEUKOCYTE ESTERASE UR-ACNC: NEGATIVE — SIGNIFICANT CHANGE UP
LYMPHOCYTES # BLD AUTO: 1.14 K/UL — SIGNIFICANT CHANGE UP (ref 1–3.3)
LYMPHOCYTES # BLD AUTO: 11 % — LOW (ref 13–44)
MAGNESIUM SERPL-MCNC: 1.8 MG/DL — SIGNIFICANT CHANGE UP (ref 1.6–2.6)
MCHC RBC-ENTMCNC: 31.2 PG — SIGNIFICANT CHANGE UP (ref 27–34)
MCHC RBC-ENTMCNC: 36 % — SIGNIFICANT CHANGE UP (ref 32–36)
MCV RBC AUTO: 86.4 FL — SIGNIFICANT CHANGE UP (ref 80–100)
MONOCYTES # BLD AUTO: 0.26 K/UL — SIGNIFICANT CHANGE UP (ref 0–0.9)
MONOCYTES NFR BLD AUTO: 2.5 % — SIGNIFICANT CHANGE UP (ref 2–14)
MUCOUS THREADS # UR AUTO: SIGNIFICANT CHANGE UP
NEUTROPHILS # BLD AUTO: 8.89 K/UL — HIGH (ref 1.8–7.4)
NEUTROPHILS NFR BLD AUTO: 86 % — HIGH (ref 43–77)
NITRITE UR-MCNC: NEGATIVE — SIGNIFICANT CHANGE UP
NRBC # FLD: 0 — SIGNIFICANT CHANGE UP
PH UR: 6 — SIGNIFICANT CHANGE UP (ref 4.6–8)
PHOSPHATE SERPL-MCNC: 3.1 MG/DL — SIGNIFICANT CHANGE UP (ref 2.5–4.5)
PLATELET # BLD AUTO: 220 K/UL — SIGNIFICANT CHANGE UP (ref 150–400)
PMV BLD: 12.9 FL — SIGNIFICANT CHANGE UP (ref 7–13)
POTASSIUM SERPL-MCNC: 3.5 MMOL/L — SIGNIFICANT CHANGE UP (ref 3.5–5.3)
POTASSIUM SERPL-SCNC: 3.5 MMOL/L — SIGNIFICANT CHANGE UP (ref 3.5–5.3)
PROT SERPL-MCNC: 7.6 G/DL — SIGNIFICANT CHANGE UP (ref 6–8.3)
PROT UR-MCNC: 20 — SIGNIFICANT CHANGE UP
RBC # BLD: 3.98 M/UL — SIGNIFICANT CHANGE UP (ref 3.8–5.2)
RBC # FLD: 13.1 % — SIGNIFICANT CHANGE UP (ref 10.3–14.5)
RBC CASTS # UR COMP ASSIST: SIGNIFICANT CHANGE UP (ref 0–?)
SODIUM SERPL-SCNC: 139 MMOL/L — SIGNIFICANT CHANGE UP (ref 135–145)
SP GR SPEC: 1.03 — SIGNIFICANT CHANGE UP (ref 1–1.03)
SQUAMOUS # UR AUTO: SIGNIFICANT CHANGE UP
UROBILINOGEN FLD QL: NORMAL E.U. — SIGNIFICANT CHANGE UP (ref 0.1–0.2)
WBC # BLD: 10.34 K/UL — SIGNIFICANT CHANGE UP (ref 3.8–10.5)
WBC # FLD AUTO: 10.34 K/UL — SIGNIFICANT CHANGE UP (ref 3.8–10.5)
WBC UR QL: SIGNIFICANT CHANGE UP (ref 0–?)

## 2017-09-21 PROCEDURE — 99291 CRITICAL CARE FIRST HOUR: CPT

## 2017-09-21 PROCEDURE — 76830 TRANSVAGINAL US NON-OB: CPT | Mod: 26

## 2017-09-21 RX ORDER — SODIUM CHLORIDE 9 MG/ML
1000 INJECTION INTRAMUSCULAR; INTRAVENOUS; SUBCUTANEOUS ONCE
Qty: 0 | Refills: 0 | Status: COMPLETED | OUTPATIENT
Start: 2017-09-21 | End: 2017-09-21

## 2017-09-21 RX ORDER — MORPHINE SULFATE 50 MG/1
4 CAPSULE, EXTENDED RELEASE ORAL ONCE
Qty: 0 | Refills: 0 | Status: DISCONTINUED | OUTPATIENT
Start: 2017-09-21 | End: 2017-09-21

## 2017-09-21 RX ORDER — METOCLOPRAMIDE HCL 10 MG
10 TABLET ORAL ONCE
Qty: 0 | Refills: 0 | Status: COMPLETED | OUTPATIENT
Start: 2017-09-21 | End: 2017-09-21

## 2017-09-21 RX ORDER — METOCLOPRAMIDE HCL 10 MG
1 TABLET ORAL
Qty: 20 | Refills: 0 | OUTPATIENT
Start: 2017-09-21 | End: 2017-09-26

## 2017-09-21 RX ORDER — ONDANSETRON 8 MG/1
4 TABLET, FILM COATED ORAL ONCE
Qty: 0 | Refills: 0 | Status: COMPLETED | OUTPATIENT
Start: 2017-09-21 | End: 2017-09-21

## 2017-09-21 RX ADMIN — SODIUM CHLORIDE 1000 MILLILITER(S): 9 INJECTION INTRAMUSCULAR; INTRAVENOUS; SUBCUTANEOUS at 02:31

## 2017-09-21 RX ADMIN — Medication 10 MILLIGRAM(S): at 03:02

## 2017-09-21 RX ADMIN — SODIUM CHLORIDE 1000 MILLILITER(S): 9 INJECTION INTRAMUSCULAR; INTRAVENOUS; SUBCUTANEOUS at 03:49

## 2017-09-21 RX ADMIN — ONDANSETRON 4 MILLIGRAM(S): 8 TABLET, FILM COATED ORAL at 02:32

## 2017-09-21 RX ADMIN — MORPHINE SULFATE 4 MILLIGRAM(S): 50 CAPSULE, EXTENDED RELEASE ORAL at 03:02

## 2017-09-21 NOTE — ED ADULT NURSE NOTE - OBJECTIVE STATEMENT
Pt received in #24, aaox3 with c/o b/l lower abd pain, nausea and vomiting. States she had a positive home pregnancy test and "I did the calculation and I am about 6 weeks pregnant". States she had a hx of ectopic pregnancy in July 2017. Denies vaginal bleeding, vaginal discharge, fever, or chills.

## 2017-09-21 NOTE — ED PROVIDER NOTE - OBJECTIVE STATEMENT
31 yo F  previous ectopic on left leading to salpingectomy here at [facility] that presents with severe abd pain, N/V x 1 day found out pregnant a few days ago from home preg test, has not seen MD for this pregnancy. LMP Aug 27, 2017. No urinary symptoms, no abnormal food intakes. Denies chest pain, SOB, falls, trauma, travel, rash. She has not ad fevers, chills, this is possible feeling same as previous ectopic.

## 2017-09-21 NOTE — ED PROVIDER NOTE - DIAGNOSIS COUNSELING, MDM
conducted a detailed discussion... please followup with your OBGYN outpt. Take the Rx'd prenatal vitamins during your pregnancy. Return for worsening symptoms.

## 2017-09-21 NOTE — ED PROVIDER NOTE - MEDICAL DECISION MAKING DETAILS
33 yo  LMP Aug 27 that presents with abd pain, N/V x 1 day. Worry for ectopic given history of ectopic. Will obtain emergent US and labs and provide meds and fluids and reassess. 33 yo  LMP Aug 27 that presents with abd pain, N/V x 1 day. Worry for ectopic given history of ectopic. Will obtain emergent US and labs and provide meds and fluids and reassess.  UA neg. Blood type reviewed from previous chart shows B POS and no reports vaginal bleed so no need for Rhogam. HCG 63K and US shows IUP so no ectopic. Will send home, f/u with PMD and OBGYN, list of our OBGYN given. Rx for prenatal vitamins.

## 2017-09-21 NOTE — ED ADULT NURSE NOTE - CHIEF COMPLAINT QUOTE
pt. approximately 6 wks pregnant c/o lower abd pain, nausea and numerous episodes of non-bloody emesis since this AM.  Denies vaginal bleeding/discharge.  LMP 08/06/17.  PMHx ectopic pregnancy.    Addendum: Pt. continuously vomiting in triage, appears very uncomfortable, 20G IV saline lock placed in the L AC, labs drawn and sent as ordered, pt. medicated as ordered by MD Camara.

## 2017-09-21 NOTE — ED ADULT TRIAGE NOTE - CHIEF COMPLAINT QUOTE
pt. approximately 6 wks pregnant c/o lower abd pain, nausea and numerous episodes of non-bloody emesis since this AM.  Denies vaginal bleeding/discharge.  LMP 08/06/17.  PMHx ectopic pregnancy. pt. approximately 6 wks pregnant c/o lower abd pain, nausea and numerous episodes of non-bloody emesis since this AM.  Denies vaginal bleeding/discharge.  LMP 08/06/17.  PMHx ectopic pregnancy.    Addendum: Pt. actively vomiting in triage, appears very uncomfortable, 20G IV saline lock placed in the L AC, labs drawn and sent as ordered, pt. medicated as ordered by MD Camara. pt. approximately 6 wks pregnant c/o lower abd pain, nausea and numerous episodes of non-bloody emesis since this AM.  Denies vaginal bleeding/discharge.  LMP 08/06/17.  PMHx ectopic pregnancy.    Addendum: Pt. continuously vomiting in triage, appears very uncomfortable, 20G IV saline lock placed in the L AC, labs drawn and sent as ordered, pt. medicated as ordered by MD Camara.

## 2017-09-22 ENCOUNTER — EMERGENCY (EMERGENCY)
Facility: HOSPITAL | Age: 32
LOS: 1 days | Discharge: HOME | End: 2017-09-22
Attending: EMERGENCY MEDICINE | Admitting: EMERGENCY MEDICINE
Payer: MEDICAID

## 2017-09-22 VITALS
TEMPERATURE: 98 F | SYSTOLIC BLOOD PRESSURE: 100 MMHG | DIASTOLIC BLOOD PRESSURE: 67 MMHG | RESPIRATION RATE: 18 BRPM | HEART RATE: 67 BPM | OXYGEN SATURATION: 100 %

## 2017-09-22 DIAGNOSIS — Z90.79 ACQUIRED ABSENCE OF OTHER GENITAL ORGAN(S): Chronic | ICD-10-CM

## 2017-09-22 DIAGNOSIS — Z98.891 HISTORY OF UTERINE SCAR FROM PREVIOUS SURGERY: Chronic | ICD-10-CM

## 2017-09-22 LAB
ALBUMIN SERPL ELPH-MCNC: 5.1 G/DL — HIGH (ref 3.3–5)
ALP SERPL-CCNC: 39 U/L — LOW (ref 40–120)
ALT FLD-CCNC: 40 U/L — HIGH (ref 4–33)
APTT BLD: 24.7 SEC — LOW (ref 27.5–37.4)
AST SERPL-CCNC: 25 U/L — SIGNIFICANT CHANGE UP (ref 4–32)
BACTERIA UR CULT: SIGNIFICANT CHANGE UP
BASOPHILS # BLD AUTO: 0.03 K/UL — SIGNIFICANT CHANGE UP (ref 0–0.2)
BASOPHILS NFR BLD AUTO: 0.3 % — SIGNIFICANT CHANGE UP (ref 0–2)
BILIRUB SERPL-MCNC: 0.5 MG/DL — SIGNIFICANT CHANGE UP (ref 0.2–1.2)
BLD GP AB SCN SERPL QL: NEGATIVE — SIGNIFICANT CHANGE UP
BUN SERPL-MCNC: 8 MG/DL — SIGNIFICANT CHANGE UP (ref 7–23)
CALCIUM SERPL-MCNC: 9.6 MG/DL — SIGNIFICANT CHANGE UP (ref 8.4–10.5)
CHLORIDE SERPL-SCNC: 99 MMOL/L — SIGNIFICANT CHANGE UP (ref 98–107)
CO2 SERPL-SCNC: 17 MMOL/L — LOW (ref 22–31)
CREAT SERPL-MCNC: 0.59 MG/DL — SIGNIFICANT CHANGE UP (ref 0.5–1.3)
EOSINOPHIL # BLD AUTO: 0 K/UL — SIGNIFICANT CHANGE UP (ref 0–0.5)
EOSINOPHIL NFR BLD AUTO: 0 % — SIGNIFICANT CHANGE UP (ref 0–6)
GLUCOSE SERPL-MCNC: 104 MG/DL — HIGH (ref 70–99)
HCG SERPL-ACNC: SIGNIFICANT CHANGE UP MIU/ML
HCT VFR BLD CALC: 34.8 % — SIGNIFICANT CHANGE UP (ref 34.5–45)
HGB BLD-MCNC: 12.5 G/DL — SIGNIFICANT CHANGE UP (ref 11.5–15.5)
IMM GRANULOCYTES # BLD AUTO: 0.05 # — SIGNIFICANT CHANGE UP
IMM GRANULOCYTES NFR BLD AUTO: 0.5 % — SIGNIFICANT CHANGE UP (ref 0–1.5)
INR BLD: 1.14 — SIGNIFICANT CHANGE UP (ref 0.88–1.17)
LYMPHOCYTES # BLD AUTO: 1.5 K/UL — SIGNIFICANT CHANGE UP (ref 1–3.3)
LYMPHOCYTES # BLD AUTO: 14.8 % — SIGNIFICANT CHANGE UP (ref 13–44)
MCHC RBC-ENTMCNC: 31.3 PG — SIGNIFICANT CHANGE UP (ref 27–34)
MCHC RBC-ENTMCNC: 35.9 % — SIGNIFICANT CHANGE UP (ref 32–36)
MCV RBC AUTO: 87 FL — SIGNIFICANT CHANGE UP (ref 80–100)
MONOCYTES # BLD AUTO: 0.52 K/UL — SIGNIFICANT CHANGE UP (ref 0–0.9)
MONOCYTES NFR BLD AUTO: 5.1 % — SIGNIFICANT CHANGE UP (ref 2–14)
NEUTROPHILS # BLD AUTO: 8.04 K/UL — HIGH (ref 1.8–7.4)
NEUTROPHILS NFR BLD AUTO: 79.3 % — HIGH (ref 43–77)
NRBC # FLD: 0 — SIGNIFICANT CHANGE UP
PLATELET # BLD AUTO: 233 K/UL — SIGNIFICANT CHANGE UP (ref 150–400)
PMV BLD: 13.3 FL — HIGH (ref 7–13)
POTASSIUM SERPL-MCNC: 3.2 MMOL/L — LOW (ref 3.5–5.3)
POTASSIUM SERPL-SCNC: 3.2 MMOL/L — LOW (ref 3.5–5.3)
PROT SERPL-MCNC: 8.1 G/DL — SIGNIFICANT CHANGE UP (ref 6–8.3)
PROTHROM AB SERPL-ACNC: 12.8 SEC — SIGNIFICANT CHANGE UP (ref 9.8–13.1)
RBC # BLD: 4 M/UL — SIGNIFICANT CHANGE UP (ref 3.8–5.2)
RBC # FLD: 13.2 % — SIGNIFICANT CHANGE UP (ref 10.3–14.5)
RH IG SCN BLD-IMP: POSITIVE — SIGNIFICANT CHANGE UP
SODIUM SERPL-SCNC: 138 MMOL/L — SIGNIFICANT CHANGE UP (ref 135–145)
SPECIMEN SOURCE: SIGNIFICANT CHANGE UP
WBC # BLD: 10.14 K/UL — SIGNIFICANT CHANGE UP (ref 3.8–10.5)
WBC # FLD AUTO: 10.14 K/UL — SIGNIFICANT CHANGE UP (ref 3.8–10.5)

## 2017-09-22 PROCEDURE — 74181 MRI ABDOMEN W/O CONTRAST: CPT | Mod: 26

## 2017-09-22 PROCEDURE — 99285 EMERGENCY DEPT VISIT HI MDM: CPT

## 2017-09-22 PROCEDURE — 72195 MRI PELVIS W/O DYE: CPT | Mod: 26

## 2017-09-22 RX ORDER — MORPHINE SULFATE 50 MG/1
4 CAPSULE, EXTENDED RELEASE ORAL ONCE
Qty: 0 | Refills: 0 | Status: DISCONTINUED | OUTPATIENT
Start: 2017-09-22 | End: 2017-09-22

## 2017-09-22 RX ORDER — FAMOTIDINE 10 MG/ML
20 INJECTION INTRAVENOUS ONCE
Qty: 0 | Refills: 0 | Status: COMPLETED | OUTPATIENT
Start: 2017-09-22 | End: 2017-09-22

## 2017-09-22 RX ORDER — ONDANSETRON 8 MG/1
4 TABLET, FILM COATED ORAL ONCE
Qty: 0 | Refills: 0 | Status: COMPLETED | OUTPATIENT
Start: 2017-09-22 | End: 2017-09-22

## 2017-09-22 RX ORDER — SODIUM CHLORIDE 9 MG/ML
1000 INJECTION INTRAMUSCULAR; INTRAVENOUS; SUBCUTANEOUS ONCE
Qty: 0 | Refills: 0 | Status: COMPLETED | OUTPATIENT
Start: 2017-09-22 | End: 2017-09-22

## 2017-09-22 RX ADMIN — ONDANSETRON 4 MILLIGRAM(S): 8 TABLET, FILM COATED ORAL at 16:08

## 2017-09-22 RX ADMIN — MORPHINE SULFATE 4 MILLIGRAM(S): 50 CAPSULE, EXTENDED RELEASE ORAL at 17:30

## 2017-09-22 RX ADMIN — MORPHINE SULFATE 4 MILLIGRAM(S): 50 CAPSULE, EXTENDED RELEASE ORAL at 17:06

## 2017-09-22 RX ADMIN — SODIUM CHLORIDE 2000 MILLILITER(S): 9 INJECTION INTRAMUSCULAR; INTRAVENOUS; SUBCUTANEOUS at 16:05

## 2017-09-22 RX ADMIN — FAMOTIDINE 20 MILLIGRAM(S): 10 INJECTION INTRAVENOUS at 16:54

## 2017-09-22 NOTE — ED PROVIDER NOTE - ATTENDING CONTRIBUTION TO CARE
I, Jennifer Cabot, MD, have performed a history and physical exam of the patient and discussed their management with the resident. I reviewed the resident's note and agree with the documented findings and plan of care. My medical decision making and observations are found above.    Cabot: 32F multiparous, with history of multiple c sections and laparoscopic L ectopic resection, now 7 weeks pregnant, back with N/V and severe LLQ pain x 2 days.  Denies flatus and BMs x several days.  On exam, HDS and VSS but with scaphoid abd, TTP in the LLQ with guarding, no rebound, no CVAT.  DDx includes constipation, viral illness, hyperemesis of pregnancy, less likely SBO, diverticulitis.  Will perform MRI without contrast.  Will also give antiemetics, fluids, and pain control.

## 2017-09-22 NOTE — ED PROVIDER NOTE - PLAN OF CARE
You were seen in the ER for abdominal pain. You must follow up with your primary physician in 24 to 48 hours. Return to the ER for any new or worsening signs/symptoms.   1) Take Tylenol (acetaminophen) 1000 mg every 6 to 8 hours as needed for pain with a daily maximal dose of 3000 mg.  2) You must follow up with your obstetrician in 24 to 48 hours.

## 2017-09-22 NOTE — ED PROVIDER NOTE - CHIEF COMPLAINT
The patient is a 32y Female complaining of The patient is a 32y Female complaining of abdominal pain and vomiting.

## 2017-09-22 NOTE — ED ADULT NURSE NOTE - ED STAT RN HANDOFF DETAILS
pt. admitted to CDU. pt. feeling nauseated, c/o pain on LLQ. MD aware. meds given as ordered. report given to BHAVNA Orantes.

## 2017-09-22 NOTE — ED ADULT NURSE NOTE - OBJECTIVE STATEMENT
31 y/o female presents to ED with c/o LLQ pain.  Pt , currently 7 weeks pregnant with c/o LLQ pain, pt seen in ED for same yesterday dx with ovarian cyst.  Pt states that her pain is worse accompanied by nausea and vomiting 33 y/o female presents to ED with c/o LLQ pain.  Pt , currently 7 weeks pregnant with c/o LLQ pain, pt seen in ED for same yesterday dx with ovarian cyst.  Pt states that her pain is worse accompanied by nausea and vomiting, unable to tolerate PO 31 y/o female presents to ED with c/o LLQ pain.  Pt , currently 7 weeks pregnant with c/o LLQ pain, pt seen in ED for same yesterday dx with ovarian cyst.  Pt states that her pain is worse accompanied by nausea and vomiting, unable to tolerate PO.  Pt denies fever chills, states that she has some light pink vaginal spotting.  No cough, no CP SOB.  IV established, labs drawn and sent.  Medicated as per order, will cont to monitor.

## 2017-09-22 NOTE — ED PROVIDER NOTE - OBJECTIVE STATEMENT
32 y.o. s/p laparoscopic removal of ectopic pregnancy June 2017 p/w lower abdominal pain and vomiting x 2 days.  She came to [facility] ED 2 days ago with the same symptoms, which resolved after antiemetics and morphine x 1.  TVUS on Wednesday in ED showed live IUP @ 7 weeks, right 2cm ovarian cyst, and patient was discharge home.  She also feels generally unwell. 32 y.o. s/p laparoscopic removal of ectopic pregnancy June 2017 p/w lower abdominal pain and vomiting x 2 days.  She came to [facility] ED 2 days ago with the same symptoms, which resolved after antiemetics and morphine x 1.  TVUS on Wednesday in ED showed live IUP @ 7 weeks, right 2cm ovarian cyst, and patient was discharge home.  She also feels generally unwell.  No BM in a couple day.

## 2017-09-22 NOTE — ED PROVIDER NOTE - PROGRESS NOTE DETAILS
Patient reassessed, pain improved, tolerating PO liquids and solids. MRI results showed corpus luteal cyst but no appendicitis. pain improved but now not tolerating po.  will give meds and fluids and place in CDU for further obs Patient reassessed, pain improved but not tolerating PO liquids and solids. States the food "burns my stomach". MRI results showed corpus luteal cyst but no appendicitis. will admit to CDU for obs. - MD Konrad,PhD - Resident:

## 2017-09-22 NOTE — ED PROVIDER NOTE - MEDICAL DECISION MAKING DETAILS
Cabot: 32F multiparous, with history of multiple c sections and laparoscopic L ectopic resection, now 7 weeks pregnant, back with N/V and severe LLQ pain x 2 days.  Denies flatus and BMs x several days.  On exam, HDS and VSS but with scaphoid abd, TTP in the LLQ with guarding, no rebound, no CVAT.  DDx includes constipation, viral illness, hyperemesis of pregnancy, less likely SBO, diverticulitis.  Will perform MRI without contrast.  Will also give antiemetics, fluids, and pain control.

## 2017-09-22 NOTE — ED ADULT NURSE REASSESSMENT NOTE - NS ED NURSE REASSESS COMMENT FT1
rec'd pt. a&ox3, back from MRI, appears in NAD, breathes with ease, c/o mild pain on LLQ, feels slightly nauseaous, MD made aware. awaits MRI results. will continue to monitor

## 2017-09-22 NOTE — ED PROVIDER NOTE - CARE PLAN
Principal Discharge DX:	Right lower quadrant pain  Instructions for follow-up, activity and diet:	You were seen in the ER for abdominal pain. You must follow up with your primary physician in 24 to 48 hours. Return to the ER for any new or worsening signs/symptoms.   1) Take Tylenol (acetaminophen) 1000 mg every 6 to 8 hours as needed for pain with a daily maximal dose of 3000 mg.  2) You must follow up with your obstetrician in 24 to 48 hours.

## 2017-09-22 NOTE — ED ADULT TRIAGE NOTE - CHIEF COMPLAINT QUOTE
7 weeks pregnant. Pt states she was seen in ED last night for llq abd pain. Diagnosed with left ovarian cyst and told to come back if pain becomes worse. Co increasing llq pain, vaginal spotting, nausea and vomiting. Hx of ruptured left ectopic pregnancy. Appears uncomfortable in triage

## 2017-09-23 VITALS
OXYGEN SATURATION: 100 % | SYSTOLIC BLOOD PRESSURE: 121 MMHG | TEMPERATURE: 100 F | HEART RATE: 70 BPM | DIASTOLIC BLOOD PRESSURE: 80 MMHG | RESPIRATION RATE: 16 BRPM

## 2017-09-23 DIAGNOSIS — R11.2 NAUSEA WITH VOMITING, UNSPECIFIED: ICD-10-CM

## 2017-09-23 LAB
APPEARANCE UR: CLEAR — SIGNIFICANT CHANGE UP
BILIRUB UR-MCNC: NEGATIVE — SIGNIFICANT CHANGE UP
BLOOD UR QL VISUAL: NEGATIVE — SIGNIFICANT CHANGE UP
COLOR SPEC: YELLOW — SIGNIFICANT CHANGE UP
GLUCOSE UR-MCNC: NEGATIVE — SIGNIFICANT CHANGE UP
KETONES UR-MCNC: SIGNIFICANT CHANGE UP
LEUKOCYTE ESTERASE UR-ACNC: NEGATIVE — SIGNIFICANT CHANGE UP
MUCOUS THREADS # UR AUTO: SIGNIFICANT CHANGE UP
NITRITE UR-MCNC: NEGATIVE — SIGNIFICANT CHANGE UP
PH UR: 6.5 — SIGNIFICANT CHANGE UP (ref 4.6–8)
PROT UR-MCNC: 30 — HIGH
RBC CASTS # UR COMP ASSIST: HIGH (ref 0–?)
SP GR SPEC: 1.04 — HIGH (ref 1–1.03)
SQUAMOUS # UR AUTO: SIGNIFICANT CHANGE UP
UROBILINOGEN FLD QL: 1 E.U. — SIGNIFICANT CHANGE UP (ref 0.1–0.2)
WBC CLUMPS #/AREA URNS HPF: PRESENT — HIGH (ref 0–?)
WBC UR QL: SIGNIFICANT CHANGE UP (ref 0–?)

## 2017-09-23 PROCEDURE — 99236 HOSP IP/OBS SAME DATE HI 85: CPT

## 2017-09-23 RX ORDER — DIPHENHYDRAMINE HCL 50 MG
25 CAPSULE ORAL ONCE
Qty: 0 | Refills: 0 | Status: COMPLETED | OUTPATIENT
Start: 2017-09-23 | End: 2017-09-23

## 2017-09-23 RX ORDER — METOCLOPRAMIDE HCL 10 MG
10 TABLET ORAL ONCE
Qty: 0 | Refills: 0 | Status: COMPLETED | OUTPATIENT
Start: 2017-09-23 | End: 2017-09-23

## 2017-09-23 RX ORDER — ACETAMINOPHEN 500 MG
1000 TABLET ORAL ONCE
Qty: 0 | Refills: 0 | Status: DISCONTINUED | OUTPATIENT
Start: 2017-09-23 | End: 2017-09-26

## 2017-09-23 RX ORDER — PROCHLORPERAZINE MALEATE 5 MG
5 TABLET ORAL ONCE
Qty: 0 | Refills: 0 | Status: COMPLETED | OUTPATIENT
Start: 2017-09-23 | End: 2017-09-23

## 2017-09-23 RX ORDER — SODIUM CHLORIDE 9 MG/ML
1000 INJECTION, SOLUTION INTRAVENOUS
Qty: 0 | Refills: 0 | Status: DISCONTINUED | OUTPATIENT
Start: 2017-09-23 | End: 2017-09-26

## 2017-09-23 RX ORDER — FAMOTIDINE 10 MG/ML
20 INJECTION INTRAVENOUS ONCE
Qty: 0 | Refills: 0 | Status: COMPLETED | OUTPATIENT
Start: 2017-09-23 | End: 2017-09-23

## 2017-09-23 RX ORDER — SODIUM CHLORIDE 9 MG/ML
1000 INJECTION INTRAMUSCULAR; INTRAVENOUS; SUBCUTANEOUS ONCE
Qty: 0 | Refills: 0 | Status: COMPLETED | OUTPATIENT
Start: 2017-09-23 | End: 2017-09-23

## 2017-09-23 RX ORDER — ONDANSETRON 8 MG/1
4 TABLET, FILM COATED ORAL ONCE
Qty: 0 | Refills: 0 | Status: COMPLETED | OUTPATIENT
Start: 2017-09-23 | End: 2017-09-23

## 2017-09-23 RX ADMIN — Medication 25 MILLIGRAM(S): at 11:31

## 2017-09-23 RX ADMIN — FAMOTIDINE 20 MILLIGRAM(S): 10 INJECTION INTRAVENOUS at 03:22

## 2017-09-23 RX ADMIN — SODIUM CHLORIDE 250 MILLILITER(S): 9 INJECTION, SOLUTION INTRAVENOUS at 17:00

## 2017-09-23 RX ADMIN — Medication 10 MILLIGRAM(S): at 01:46

## 2017-09-23 RX ADMIN — SODIUM CHLORIDE 250 MILLILITER(S): 9 INJECTION, SOLUTION INTRAVENOUS at 04:00

## 2017-09-23 RX ADMIN — Medication 10 MILLIGRAM(S): at 07:25

## 2017-09-23 RX ADMIN — SODIUM CHLORIDE 1000 MILLILITER(S): 9 INJECTION INTRAMUSCULAR; INTRAVENOUS; SUBCUTANEOUS at 03:22

## 2017-09-23 RX ADMIN — SODIUM CHLORIDE 250 MILLILITER(S): 9 INJECTION, SOLUTION INTRAVENOUS at 17:15

## 2017-09-23 RX ADMIN — ONDANSETRON 4 MILLIGRAM(S): 8 TABLET, FILM COATED ORAL at 03:22

## 2017-09-23 RX ADMIN — Medication 5 MILLIGRAM(S): at 17:15

## 2017-09-23 NOTE — ED CDU PROVIDER NOTE - PROGRESS NOTE DETAILS
Karyna: seen and examined this am. pt p/w left sided abdominal pain and vomiting. s/p TVUS showing a small cyst and MRI abdomen showing normal appendix, no other pathology. pt has continued to vomit, sent to CDU for hyperemesis mgmt. pt has vomited overnight and still feeling nauseous. s/p morphine, zofran. will continue to manage, add compazine prn. if continues to not improve and not tolerating po, will consult OB for admission. LO Doherty- Pt states that she has a family emergency with her children and needs to go home. Pt admits to feeling slightly better after the compazine and does not want to be admitted. Pt is a&ox3 and capable to make her own medical decisions. Pt states that she will return to the hospital if symptoms return or worsen. Pt stable for dc. Will speak with GYN team. Karyna attg dc note: see my earlier note. pt was vomiting throughout the day, was seen and admitted by OB. However, she then felt better after compazine, able to take PO and requesting dc as she had a family emergency. will send anti-emetics to pharmacy, rter if persistent vomiting, unable to take po.

## 2017-09-23 NOTE — CONSULT NOTE ADULT - SUBJECTIVE AND OBJECTIVE BOX
31 y/o  at 6.5w by LMP () presenting for vomiting over the past 4 days. Pt reports that she started having nausea 4 days ago and has vomited any food she has tried to east since. Also has mild abdominal cramps. She denies any other complaints. So far in the ED they have tried multiple doses of Zofran and Reglan and has still not tolerated po.  OBHx: 1x c/s, 4x , 3x SAB, 2x TOP, one medical, one with D&C, one ruptured ectopic on 17 resulting in left salpingectomy  GynHx: Denies  PMH: Denies  SHx: LSC left salpingectomy as above  All: NKDA  Meds: None    VS  T(C): 37.6 (17 @ 14:30)  HR: 70 (17 @ 14:30)  BP: 121/80 (17 @ 14:30)  RR: 16 (17 @ 14:30)  SpO2: 100% (17 @ 14:30)    Physical Exam:  General: NAD  CV: NR, RR, S1, S2, no M/R/G  Lungs: CTA-B  Abdomen: Soft, non-tender, non-distended               12.5   10.14 )-----------( 233      (  @ 15:25 )             34.8      @ 15:25    138  |  99  |  8   ----------------------------<  104  3.2   |  17  |  0.59    Ca    9.6       @ 15:25    TPro  8.1  /  Alb  5.1  /  TBili  0.5  /  DBili  x   /  AST  25  /  ALT  40  /  AlkPhos  39   @ 15:25    HCG Quantitative, Serum (17 @ 15:25)    HCG Quantitative, Serum: 80421    < from: US Transvaginal (17 @ 03:45) >  XAM:  US TRANSVAGINAL        PROCEDURE DATE:  Sep 21 2017         INTERPRETATION:  CLINICAL INFORMATION: Abdominal pain. Concern for   ectopic pregnancy.    COMPARISON: Pelvic ultrasound dated 2017.    TECHNIQUE: Transvaginal pregnancy ultrasound was performed.    FINDINGS:    There is a single live intrauterine pregnancy.   The gestational sac has   a mean sac diameter of  2.7 cm, which corresponds to gestational age of    7 weeks and 3 days.  The yolk sac measures 4 mm.  The embryonic crown   rump length measures 1.0 cm, which corresponds to gestational age of  7   weeks and 1 days.  The embryonic heart rate of 151 bpm.    The cervix is long and closed.    The right ovary measures 3.1 x 2.6 x 2.7 and contains a 1.9 x 1.6 x 1.8   cm hemorrhagic corpus luteum.    The left ovary measures 2.3 x 2 x 1.3 cm and contains a 1.3 cm dominant   follicle.    Both ovaries demonstrate normal vascularity.    There is no pelvic free fluid.    IMPRESSION:    Single live intrauterine pregnancy with estimated gestational age of  7   weeks and 1 day by embryonic crown-rump measurement.  1.9 cm hemorrhagic right ovarian corpus luteum.  Long and closed cervix.  No pelvic free fluid.    < end of copied text >

## 2017-09-23 NOTE — CONSULT NOTE ADULT - PROBLEM SELECTOR RECOMMENDATION 9
- Plan was to admit patient to antepartum service for further management of her vomiting, put per ED provider pt has signed out AMA due to a family emergency before she could be admitted    d/w Dr. Elisha Beaulieu, PGY-2  [phone]

## 2017-09-23 NOTE — ED CDU PROVIDER NOTE - ATTENDING CONTRIBUTION TO CARE
Camara: 32 yof with appx 7 weeks pregnant presented to ED for lower abd pain with nausea and vomiting.  Initial eval in the ED thought pt had llq pain and has us and mri which showed no appx, small right sided cyst.  Labs unremarkable but unable to tolerate po.  On exam, pt is well appearing, no distress, normal cardiac, lungs, abd with mild RIGHT lq tn, no cvat.  Pt placed in CDU for continued medication and hydration for hyperemesis.

## 2017-09-23 NOTE — ED CDU PROVIDER NOTE - OBJECTIVE STATEMENT
32 y.o. s/p laparoscopic removal of ectopic pregnancy 2017 p/w lower abdominal pain and vomiting x 2 days.  She came to [facility] ED 2 days ago with the same symptoms, which resolved after antiemetics and morphine x 1.  TVUS on Wednesday in ED showed live IUP @ 7 weeks, right 2cm ovarian cyst, and patient was discharge home.  She also feels generally unwell.  No BM in a couple day.    PA Baseil: Agree with above. 32 year old female , 7wks pregnant; presents to the ED complaining of generalized abdominal pain and vomiting x 2-3 days. She states she has been having up to ten episodes of non-bloody, sometimes bilious vomiting daily; endorses intermittent light spotting. She was seen in the ED 2 days ago for the same symptoms, was given morphine and antiemetics with improvement. Today, an MRI was performed which showed IUP and a right ovarian cyst. Pt was going to be discharged home but was unable to tolerate PO. No vaginal discharge, dysuria, f/c/d, or other complaints.

## 2017-09-24 ENCOUNTER — INPATIENT (INPATIENT)
Facility: HOSPITAL | Age: 32
LOS: 0 days | Discharge: HOME | End: 2017-09-25
Attending: OBSTETRICS & GYNECOLOGY | Admitting: OBSTETRICS & GYNECOLOGY

## 2017-09-24 VITALS
HEART RATE: 70 BPM | RESPIRATION RATE: 18 BRPM | OXYGEN SATURATION: 100 % | SYSTOLIC BLOOD PRESSURE: 107 MMHG | DIASTOLIC BLOOD PRESSURE: 72 MMHG | TEMPERATURE: 98 F

## 2017-09-24 DIAGNOSIS — O21.0 MILD HYPEREMESIS GRAVIDARUM: ICD-10-CM

## 2017-09-24 DIAGNOSIS — Z90.79 ACQUIRED ABSENCE OF OTHER GENITAL ORGAN(S): Chronic | ICD-10-CM

## 2017-09-24 DIAGNOSIS — Z98.891 HISTORY OF UTERINE SCAR FROM PREVIOUS SURGERY: Chronic | ICD-10-CM

## 2017-09-24 LAB
ALBUMIN SERPL ELPH-MCNC: 4.4 G/DL — SIGNIFICANT CHANGE UP (ref 3.3–5)
ALP SERPL-CCNC: 36 U/L — LOW (ref 40–120)
ALT FLD-CCNC: 37 U/L — HIGH (ref 4–33)
AST SERPL-CCNC: 21 U/L — SIGNIFICANT CHANGE UP (ref 4–32)
BACTERIA UR CULT: SIGNIFICANT CHANGE UP
BASE EXCESS BLDV CALC-SCNC: -1.4 MMOL/L — SIGNIFICANT CHANGE UP
BASOPHILS # BLD AUTO: 0.02 K/UL — SIGNIFICANT CHANGE UP (ref 0–0.2)
BASOPHILS NFR BLD AUTO: 0.2 % — SIGNIFICANT CHANGE UP (ref 0–2)
BILIRUB SERPL-MCNC: 0.5 MG/DL — SIGNIFICANT CHANGE UP (ref 0.2–1.2)
BLOOD GAS VENOUS - CREATININE: 0.45 MG/DL — LOW (ref 0.5–1.3)
BUN SERPL-MCNC: 3 MG/DL — LOW (ref 7–23)
CALCIUM SERPL-MCNC: 8.8 MG/DL — SIGNIFICANT CHANGE UP (ref 8.4–10.5)
CHLORIDE BLDV-SCNC: 105 MMOL/L — SIGNIFICANT CHANGE UP (ref 96–108)
CHLORIDE SERPL-SCNC: 100 MMOL/L — SIGNIFICANT CHANGE UP (ref 98–107)
CO2 SERPL-SCNC: 19 MMOL/L — LOW (ref 22–31)
CREAT SERPL-MCNC: 0.46 MG/DL — LOW (ref 0.5–1.3)
EOSINOPHIL # BLD AUTO: 0.01 K/UL — SIGNIFICANT CHANGE UP (ref 0–0.5)
EOSINOPHIL NFR BLD AUTO: 0.1 % — SIGNIFICANT CHANGE UP (ref 0–6)
GAS PNL BLDV: 134 MMOL/L — LOW (ref 136–146)
GLUCOSE BLDV-MCNC: 92 — SIGNIFICANT CHANGE UP (ref 70–99)
GLUCOSE SERPL-MCNC: 96 MG/DL — SIGNIFICANT CHANGE UP (ref 70–99)
HCO3 BLDV-SCNC: 24 MMOL/L — SIGNIFICANT CHANGE UP (ref 20–27)
HCT VFR BLD CALC: 34 % — LOW (ref 34.5–45)
HCT VFR BLDV CALC: 38.8 % — SIGNIFICANT CHANGE UP (ref 34.5–45)
HGB BLD-MCNC: 12 G/DL — SIGNIFICANT CHANGE UP (ref 11.5–15.5)
HGB BLDV-MCNC: 12.6 G/DL — SIGNIFICANT CHANGE UP (ref 11.5–15.5)
IMM GRANULOCYTES # BLD AUTO: 0.02 # — SIGNIFICANT CHANGE UP
IMM GRANULOCYTES NFR BLD AUTO: 0.2 % — SIGNIFICANT CHANGE UP (ref 0–1.5)
LACTATE BLDV-MCNC: 1 MMOL/L — SIGNIFICANT CHANGE UP (ref 0.5–2)
LYMPHOCYTES # BLD AUTO: 1.84 K/UL — SIGNIFICANT CHANGE UP (ref 1–3.3)
LYMPHOCYTES # BLD AUTO: 19.3 % — SIGNIFICANT CHANGE UP (ref 13–44)
MCHC RBC-ENTMCNC: 30.2 PG — SIGNIFICANT CHANGE UP (ref 27–34)
MCHC RBC-ENTMCNC: 35.3 % — SIGNIFICANT CHANGE UP (ref 32–36)
MCV RBC AUTO: 85.6 FL — SIGNIFICANT CHANGE UP (ref 80–100)
MONOCYTES # BLD AUTO: 0.72 K/UL — SIGNIFICANT CHANGE UP (ref 0–0.9)
MONOCYTES NFR BLD AUTO: 7.6 % — SIGNIFICANT CHANGE UP (ref 2–14)
NEUTROPHILS # BLD AUTO: 6.92 K/UL — SIGNIFICANT CHANGE UP (ref 1.8–7.4)
NEUTROPHILS NFR BLD AUTO: 72.6 % — SIGNIFICANT CHANGE UP (ref 43–77)
NRBC # FLD: 0 — SIGNIFICANT CHANGE UP
PCO2 BLDV: 23 MMHG — LOW (ref 41–51)
PH BLDV: 7.57 PH — HIGH (ref 7.32–7.43)
PLATELET # BLD AUTO: 210 K/UL — SIGNIFICANT CHANGE UP (ref 150–400)
PMV BLD: 13.2 FL — HIGH (ref 7–13)
PO2 BLDV: 52 MMHG — HIGH (ref 35–40)
POTASSIUM BLDV-SCNC: 2.6 MMOL/L — CRITICAL LOW (ref 3.4–4.5)
POTASSIUM SERPL-MCNC: 2.7 MMOL/L — CRITICAL LOW (ref 3.5–5.3)
POTASSIUM SERPL-SCNC: 2.7 MMOL/L — CRITICAL LOW (ref 3.5–5.3)
PROT SERPL-MCNC: 7.1 G/DL — SIGNIFICANT CHANGE UP (ref 6–8.3)
RBC # BLD: 3.97 M/UL — SIGNIFICANT CHANGE UP (ref 3.8–5.2)
RBC # FLD: 12.7 % — SIGNIFICANT CHANGE UP (ref 10.3–14.5)
SAO2 % BLDV: 89.6 % — HIGH (ref 60–85)
SODIUM SERPL-SCNC: 137 MMOL/L — SIGNIFICANT CHANGE UP (ref 135–145)
SPECIMEN SOURCE: SIGNIFICANT CHANGE UP
WBC # BLD: 9.53 K/UL — SIGNIFICANT CHANGE UP (ref 3.8–10.5)
WBC # FLD AUTO: 9.53 K/UL — SIGNIFICANT CHANGE UP (ref 3.8–10.5)

## 2017-09-24 RX ORDER — DOCUSATE SODIUM 100 MG
100 CAPSULE ORAL THREE TIMES A DAY
Qty: 0 | Refills: 0 | Status: DISCONTINUED | OUTPATIENT
Start: 2017-09-24 | End: 2017-09-25

## 2017-09-24 RX ORDER — SODIUM CHLORIDE 9 MG/ML
1000 INJECTION, SOLUTION INTRAVENOUS
Qty: 0 | Refills: 0 | Status: DISCONTINUED | OUTPATIENT
Start: 2017-09-24 | End: 2017-09-24

## 2017-09-24 RX ORDER — PROCHLORPERAZINE MALEATE 5 MG
5 TABLET ORAL ONCE
Qty: 0 | Refills: 0 | Status: COMPLETED | OUTPATIENT
Start: 2017-09-24 | End: 2017-09-24

## 2017-09-24 RX ORDER — SODIUM CHLORIDE 9 MG/ML
1000 INJECTION, SOLUTION INTRAVENOUS
Qty: 0 | Refills: 0 | Status: DISCONTINUED | OUTPATIENT
Start: 2017-09-24 | End: 2017-09-25

## 2017-09-24 RX ORDER — METOCLOPRAMIDE HCL 10 MG
10 TABLET ORAL ONCE
Qty: 0 | Refills: 0 | Status: COMPLETED | OUTPATIENT
Start: 2017-09-24 | End: 2017-09-24

## 2017-09-24 RX ORDER — DIPHENHYDRAMINE HCL 50 MG
25 CAPSULE ORAL ONCE
Qty: 0 | Refills: 0 | Status: COMPLETED | OUTPATIENT
Start: 2017-09-24 | End: 2017-09-24

## 2017-09-24 RX ORDER — POTASSIUM CHLORIDE 20 MEQ
10 PACKET (EA) ORAL
Qty: 0 | Refills: 0 | Status: COMPLETED | OUTPATIENT
Start: 2017-09-24 | End: 2017-09-24

## 2017-09-24 RX ORDER — FOLIC ACID 0.8 MG
1 TABLET ORAL DAILY
Qty: 0 | Refills: 0 | Status: DISCONTINUED | OUTPATIENT
Start: 2017-09-24 | End: 2017-09-25

## 2017-09-24 RX ORDER — POTASSIUM CHLORIDE 20 MEQ
40 PACKET (EA) ORAL ONCE
Qty: 0 | Refills: 0 | Status: COMPLETED | OUTPATIENT
Start: 2017-09-24 | End: 2017-09-24

## 2017-09-24 RX ORDER — SODIUM CHLORIDE 9 MG/ML
1000 INJECTION INTRAMUSCULAR; INTRAVENOUS; SUBCUTANEOUS ONCE
Qty: 0 | Refills: 0 | Status: COMPLETED | OUTPATIENT
Start: 2017-09-24 | End: 2017-09-24

## 2017-09-24 RX ADMIN — Medication 100 MILLIEQUIVALENT(S): at 17:39

## 2017-09-24 RX ADMIN — SODIUM CHLORIDE 150 MILLILITER(S): 9 INJECTION, SOLUTION INTRAVENOUS at 14:36

## 2017-09-24 RX ADMIN — Medication 25 MILLIGRAM(S): at 13:16

## 2017-09-24 RX ADMIN — SODIUM CHLORIDE 150 MILLILITER(S): 9 INJECTION, SOLUTION INTRAVENOUS at 22:03

## 2017-09-24 RX ADMIN — Medication 100 MILLIEQUIVALENT(S): at 14:36

## 2017-09-24 RX ADMIN — SODIUM CHLORIDE 150 MILLILITER(S): 9 INJECTION, SOLUTION INTRAVENOUS at 13:17

## 2017-09-24 RX ADMIN — Medication 5 MILLIGRAM(S): at 14:36

## 2017-09-24 RX ADMIN — SODIUM CHLORIDE 150 MILLILITER(S): 9 INJECTION, SOLUTION INTRAVENOUS at 19:46

## 2017-09-24 RX ADMIN — Medication 40 MILLIEQUIVALENT(S): at 14:36

## 2017-09-24 RX ADMIN — Medication 10 MILLIGRAM(S): at 13:16

## 2017-09-24 RX ADMIN — Medication 100 MILLIEQUIVALENT(S): at 22:03

## 2017-09-24 RX ADMIN — SODIUM CHLORIDE 1000 MILLILITER(S): 9 INJECTION INTRAMUSCULAR; INTRAVENOUS; SUBCUTANEOUS at 13:16

## 2017-09-24 NOTE — H&P ADULT - HISTORY OF PRESENT ILLNESS
31 y/o  at 6.6w by LMP () presenting for vomiting over the past 5 days. Pt reports that she started having nausea 5 days ago and has vomited any food she has tried to east since. Also has mild abdominal cramps. She denies any other complaints. She presented to the ED yesterday with the same issue and left AMA due to a family emergency before she could be admitted. In the ED yesterday they had tried multiple doses of Zofran and Reglan and she still did not tolerated po. Since that time she has vomited 8 times and still not tolerated po.  OBHx: 1x c/s, 4x , 3x SAB, 2x TOP, one medical, one with D&C, one ruptured ectopic on 17 resulting in left salpingectomy  GynHx: Denies  PMH: Denies  SHx: LSC left salpingectomy as above  All: NKDA  Meds: None

## 2017-09-24 NOTE — H&P ADULT - PROBLEM SELECTOR PLAN 1
- Admit to antepartum service  - IV anti-emetics  - IVF  - Potassium repletion    d/w Dr. Kevin Beaulieu, PGY-2

## 2017-09-24 NOTE — H&P ADULT - NSHPLABSRESULTS_GEN_ALL_CORE
12.0   9.53  )-----------( 210      ( 09-24 @ 12:48 )             34.0<L>               12.5   10.14 )-----------( 233      ( 09-22 @ 15:25 )             34.8                12.4   10.34 )-----------( 220      ( 09-21 @ 02:30 )             34.4<L>    09-24 @ 12:48    137  |  100  |  3<L>  ----------------------------<  96  2.7<LL>   |  19<L>  |  0.46<L>    09-22 @ 15:25    138  |  99  |  8   ----------------------------<  104<H>  3.2<L>   |  17<L>  |  0.59    09-21 @ 02:30    139  |  103  |  7   ----------------------------<  128<H>  3.5   |  17<L>  |  0.60      Ca    8.8      09-24 @ 12:48  Ca    9.6      09-22 @ 15:25  Ca    9.2      09-21 @ 02:30  Mg     1.8     09-21 @ 02:30  Phos  3.1     09-21 @ 02:30    TPro  7.1  /  Alb  4.4  /  TBili  0.5  /  DBili  x   /  AST  21  /  ALT  37<H>  /  AlkPhos  36<L>  09-24 @ 12:48  TPro  8.1  /  Alb  5.1<H>  /  TBili  0.5  /  DBili  x   /  AST  25  /  ALT  40<H>  /  AlkPhos  39<L>  09-22 @ 15:25  TPro  7.6  /  Alb  4.7  /  TBili  0.3  /  DBili  x   /  AST  25  /  ALT  26  /  AlkPhos  38<L>  09-21 @ 02:30

## 2017-09-24 NOTE — ED ADULT NURSE NOTE - OBJECTIVE STATEMENT
33 y/o female, 7 weeks pregnant,  c/o nausea and vomiting x2 days. Pt was in the CDU x1 day ago for hyperemesis, GYN was going to admit to their service but pt had a family emergency and had to leave. Iv placed and labs obtained.

## 2017-09-24 NOTE — ED PROVIDER NOTE - OBJECTIVE STATEMENT
31 y/o female, 7 weeks pregnant,  c/o nausea and vomiting x2 days. Pt was in the CDU x1 day ago for hyperemesis, GYN was going to admit to their service but pt had a family emergency and had to leave. Pt states that she had multiple episodes of emesis while at home and was unable to tolerate PO today. Pt admits to intermittent abd cramping. Denies chest pain, sob, diarrhea, vaginal bleeding, dysuria, numbness, tingling, weakness, dizziness, syncope, fever or chills.

## 2017-09-24 NOTE — ED ADULT NURSE REASSESSMENT NOTE - NS ED NURSE REASSESS COMMENT FT1
report received at shift change in intake, pt remains AAOx3, VS as noted, pt in NAD, awaiting  floor to take handoff. will continue to monitor pt.

## 2017-09-24 NOTE — H&P ADULT - NSHPPHYSICALEXAM_GEN_ALL_CORE
VS  T(C): 36.8 (09-24-17 @ 11:50)  HR: 84 (09-24-17 @ 19:45)  BP: 115/74 (09-24-17 @ 19:45)  RR: 16 (09-24-17 @ 19:45)  SpO2: 100% (09-24-17 @ 19:45)    Physical Exam:  General: NAD  Abdomen: Soft, non-tender, non-distended

## 2017-09-25 ENCOUNTER — TRANSCRIPTION ENCOUNTER (OUTPATIENT)
Age: 32
End: 2017-09-25

## 2017-09-25 VITALS
TEMPERATURE: 98 F | HEART RATE: 69 BPM | RESPIRATION RATE: 18 BRPM | SYSTOLIC BLOOD PRESSURE: 115 MMHG | DIASTOLIC BLOOD PRESSURE: 76 MMHG | OXYGEN SATURATION: 98 %

## 2017-09-25 LAB
ALBUMIN SERPL ELPH-MCNC: 3.8 G/DL — SIGNIFICANT CHANGE UP (ref 3.3–5)
ALP SERPL-CCNC: 30 U/L — LOW (ref 40–120)
ALT FLD-CCNC: 31 U/L — SIGNIFICANT CHANGE UP (ref 4–33)
AST SERPL-CCNC: 15 U/L — SIGNIFICANT CHANGE UP (ref 4–32)
BILIRUB SERPL-MCNC: 0.4 MG/DL — SIGNIFICANT CHANGE UP (ref 0.2–1.2)
BUN SERPL-MCNC: 2 MG/DL — LOW (ref 7–23)
CALCIUM SERPL-MCNC: 8.1 MG/DL — LOW (ref 8.4–10.5)
CHLORIDE SERPL-SCNC: 103 MMOL/L — SIGNIFICANT CHANGE UP (ref 98–107)
CO2 SERPL-SCNC: 21 MMOL/L — LOW (ref 22–31)
CREAT SERPL-MCNC: 0.45 MG/DL — LOW (ref 0.5–1.3)
GLUCOSE SERPL-MCNC: 113 MG/DL — HIGH (ref 70–99)
MAGNESIUM SERPL-MCNC: 2.2 MG/DL — SIGNIFICANT CHANGE UP (ref 1.6–2.6)
PHOSPHATE SERPL-MCNC: 2.2 MG/DL — LOW (ref 2.5–4.5)
POTASSIUM SERPL-MCNC: 3 MMOL/L — LOW (ref 3.5–5.3)
POTASSIUM SERPL-SCNC: 3 MMOL/L — LOW (ref 3.5–5.3)
PROT SERPL-MCNC: 6.1 G/DL — SIGNIFICANT CHANGE UP (ref 6–8.3)
SODIUM SERPL-SCNC: 135 MMOL/L — SIGNIFICANT CHANGE UP (ref 135–145)

## 2017-09-25 RX ORDER — POTASSIUM CHLORIDE 20 MEQ
10 PACKET (EA) ORAL
Qty: 0 | Refills: 0 | Status: COMPLETED | OUTPATIENT
Start: 2017-09-25 | End: 2017-09-25

## 2017-09-25 RX ORDER — ONDANSETRON 8 MG/1
1 TABLET, FILM COATED ORAL
Qty: 90 | Refills: 0 | OUTPATIENT
Start: 2017-09-25 | End: 2017-10-25

## 2017-09-25 RX ORDER — ONDANSETRON 8 MG/1
1 TABLET, FILM COATED ORAL
Qty: 90 | Refills: 0 | OUTPATIENT
Start: 2017-09-25 | End: 2017-10-26

## 2017-09-25 RX ORDER — DEXTROSE MONOHYDRATE, SODIUM CHLORIDE, AND POTASSIUM CHLORIDE 50; .745; 4.5 G/1000ML; G/1000ML; G/1000ML
1000 INJECTION, SOLUTION INTRAVENOUS
Qty: 0 | Refills: 0 | Status: DISCONTINUED | OUTPATIENT
Start: 2017-09-25 | End: 2017-09-25

## 2017-09-25 RX ADMIN — Medication 100 MILLIEQUIVALENT(S): at 09:55

## 2017-09-25 RX ADMIN — Medication 100 MILLIEQUIVALENT(S): at 07:45

## 2017-09-25 RX ADMIN — Medication 100 MILLIEQUIVALENT(S): at 04:37

## 2017-09-25 RX ADMIN — SODIUM CHLORIDE 125 MILLILITER(S): 9 INJECTION, SOLUTION INTRAVENOUS at 09:55

## 2017-09-25 RX ADMIN — SODIUM CHLORIDE 125 MILLILITER(S): 9 INJECTION, SOLUTION INTRAVENOUS at 07:45

## 2017-09-25 RX ADMIN — DEXTROSE MONOHYDRATE, SODIUM CHLORIDE, AND POTASSIUM CHLORIDE 125 MILLILITER(S): 50; .745; 4.5 INJECTION, SOLUTION INTRAVENOUS at 04:36

## 2017-09-25 NOTE — DISCHARGE NOTE ANTEPARTUM - ADDITIONAL INSTRUCTIONS
Please follow up to initiate prenatal care.  for [facility] prenatal clinic will contact you at number provided

## 2017-09-25 NOTE — DISCHARGE NOTE ANTEPARTUM - MEDICATION SUMMARY - MEDICATIONS TO STOP TAKING
I will STOP taking the medications listed below when I get home from the hospital:    oxyCODONE 5 mg oral tablet  -- 1 tab(s) by mouth every 6 hours, As Needed MDD:4 tabs  -- Caution federal law prohibits the transfer of this drug to any person other  than the person for whom it was prescribed.  It is very important that you take or use this exactly as directed.  Do not skip doses or discontinue unless directed by your doctor.  May cause drowsiness.  Alcohol may intensify this effect.  Use care when operating dangerous machinery.  This prescription cannot be refilled.  Using more of this medication than prescribed may cause serious breathing problems.

## 2017-09-25 NOTE — DISCHARGE NOTE ANTEPARTUM - HOSPITAL COURSE
Patient admitted at 6 wks with hyperemesis. Treated with IV and phenergan with good response Passed trial of PO. At time of discharge no OB complaints.

## 2017-09-25 NOTE — DISCHARGE NOTE ANTEPARTUM - PATIENT PORTAL LINK FT
“You can access the [facility] Patient Portal, offered by [facility], by registering with the following website: [email or web address]”

## 2017-09-25 NOTE — DISCHARGE NOTE ANTEPARTUM - MEDICATION SUMMARY - MEDICATIONS TO TAKE
I will START or STAY ON the medications listed below when I get home from the hospital:    promethazine 12.5 mg oral tablet  -- 1 tab(s) by mouth every 6 hours, As Needed   -- May cause drowsiness.  Alcohol may intensify this effect.  Use care when operating dangerous machinery.  Obtain medical advice before taking any non-prescription drugs as some may affect the action of this medication.    -- Indication: For nausea    Zofran 4 mg oral tablet  -- 1 tab(s) by mouth every 8 hours, As Needed   -- Indication: For nausea

## 2017-09-26 ENCOUNTER — EMERGENCY (EMERGENCY)
Facility: HOSPITAL | Age: 32
LOS: 1 days | Discharge: HOME | End: 2017-09-26
Attending: EMERGENCY MEDICINE | Admitting: EMERGENCY MEDICINE
Payer: MEDICAID

## 2017-09-26 VITALS
SYSTOLIC BLOOD PRESSURE: 112 MMHG | HEART RATE: 73 BPM | RESPIRATION RATE: 16 BRPM | DIASTOLIC BLOOD PRESSURE: 81 MMHG | OXYGEN SATURATION: 100 % | TEMPERATURE: 98 F

## 2017-09-26 DIAGNOSIS — Z98.891 HISTORY OF UTERINE SCAR FROM PREVIOUS SURGERY: Chronic | ICD-10-CM

## 2017-09-26 DIAGNOSIS — Z90.79 ACQUIRED ABSENCE OF OTHER GENITAL ORGAN(S): Chronic | ICD-10-CM

## 2017-09-26 LAB
ALBUMIN SERPL ELPH-MCNC: 4.6 G/DL — SIGNIFICANT CHANGE UP (ref 3.3–5)
ALP SERPL-CCNC: 39 U/L — LOW (ref 40–120)
ALT FLD-CCNC: 34 U/L — HIGH (ref 4–33)
AST SERPL-CCNC: 13 U/L — SIGNIFICANT CHANGE UP (ref 4–32)
BASE EXCESS BLDV CALC-SCNC: -2.1 MMOL/L — SIGNIFICANT CHANGE UP
BASOPHILS # BLD AUTO: 0.03 K/UL — SIGNIFICANT CHANGE UP (ref 0–0.2)
BASOPHILS NFR BLD AUTO: 0.3 % — SIGNIFICANT CHANGE UP (ref 0–2)
BILIRUB SERPL-MCNC: 0.6 MG/DL — SIGNIFICANT CHANGE UP (ref 0.2–1.2)
BLD GP AB SCN SERPL QL: NEGATIVE — SIGNIFICANT CHANGE UP
BLOOD GAS VENOUS - CREATININE: 0.52 MG/DL — SIGNIFICANT CHANGE UP (ref 0.5–1.3)
BUN SERPL-MCNC: 8 MG/DL — SIGNIFICANT CHANGE UP (ref 7–23)
CALCIUM SERPL-MCNC: 9.4 MG/DL — SIGNIFICANT CHANGE UP (ref 8.4–10.5)
CHLORIDE BLDV-SCNC: 101 MMOL/L — SIGNIFICANT CHANGE UP (ref 96–108)
CHLORIDE SERPL-SCNC: 98 MMOL/L — SIGNIFICANT CHANGE UP (ref 98–107)
CO2 SERPL-SCNC: 20 MMOL/L — LOW (ref 22–31)
CREAT SERPL-MCNC: 0.5 MG/DL — SIGNIFICANT CHANGE UP (ref 0.5–1.3)
EOSINOPHIL # BLD AUTO: 0.02 K/UL — SIGNIFICANT CHANGE UP (ref 0–0.5)
EOSINOPHIL NFR BLD AUTO: 0.2 % — SIGNIFICANT CHANGE UP (ref 0–6)
GAS PNL BLDV: 135 MMOL/L — LOW (ref 136–146)
GLUCOSE BLDV-MCNC: 76 — SIGNIFICANT CHANGE UP (ref 70–99)
GLUCOSE SERPL-MCNC: 76 MG/DL — SIGNIFICANT CHANGE UP (ref 70–99)
HCG SERPL-ACNC: SIGNIFICANT CHANGE UP MIU/ML
HCO3 BLDV-SCNC: 23 MMOL/L — SIGNIFICANT CHANGE UP (ref 20–27)
HCT VFR BLD CALC: 34.8 % — SIGNIFICANT CHANGE UP (ref 34.5–45)
HCT VFR BLDV CALC: 40.6 % — SIGNIFICANT CHANGE UP (ref 34.5–45)
HGB BLD-MCNC: 12.4 G/DL — SIGNIFICANT CHANGE UP (ref 11.5–15.5)
HGB BLDV-MCNC: 13.2 G/DL — SIGNIFICANT CHANGE UP (ref 11.5–15.5)
IMM GRANULOCYTES # BLD AUTO: 0.02 # — SIGNIFICANT CHANGE UP
IMM GRANULOCYTES NFR BLD AUTO: 0.2 % — SIGNIFICANT CHANGE UP (ref 0–1.5)
LACTATE BLDV-MCNC: 1.8 MMOL/L — SIGNIFICANT CHANGE UP (ref 0.5–2)
LYMPHOCYTES # BLD AUTO: 1.73 K/UL — SIGNIFICANT CHANGE UP (ref 1–3.3)
LYMPHOCYTES # BLD AUTO: 17.7 % — SIGNIFICANT CHANGE UP (ref 13–44)
MCHC RBC-ENTMCNC: 30.7 PG — SIGNIFICANT CHANGE UP (ref 27–34)
MCHC RBC-ENTMCNC: 35.6 % — SIGNIFICANT CHANGE UP (ref 32–36)
MCV RBC AUTO: 86.1 FL — SIGNIFICANT CHANGE UP (ref 80–100)
MONOCYTES # BLD AUTO: 0.56 K/UL — SIGNIFICANT CHANGE UP (ref 0–0.9)
MONOCYTES NFR BLD AUTO: 5.7 % — SIGNIFICANT CHANGE UP (ref 2–14)
NEUTROPHILS # BLD AUTO: 7.39 K/UL — SIGNIFICANT CHANGE UP (ref 1.8–7.4)
NEUTROPHILS NFR BLD AUTO: 75.9 % — SIGNIFICANT CHANGE UP (ref 43–77)
NRBC # FLD: 0 — SIGNIFICANT CHANGE UP
PCO2 BLDV: 32 MMHG — LOW (ref 41–51)
PH BLDV: 7.45 PH — HIGH (ref 7.32–7.43)
PLATELET # BLD AUTO: 216 K/UL — SIGNIFICANT CHANGE UP (ref 150–400)
PMV BLD: 13.3 FL — HIGH (ref 7–13)
PO2 BLDV: 42 MMHG — HIGH (ref 35–40)
POTASSIUM BLDV-SCNC: 3.7 MMOL/L — SIGNIFICANT CHANGE UP (ref 3.4–4.5)
POTASSIUM SERPL-MCNC: 3.3 MMOL/L — LOW (ref 3.5–5.3)
POTASSIUM SERPL-SCNC: 3.3 MMOL/L — LOW (ref 3.5–5.3)
PROT SERPL-MCNC: 6.9 G/DL — SIGNIFICANT CHANGE UP (ref 6–8.3)
RBC # BLD: 4.04 M/UL — SIGNIFICANT CHANGE UP (ref 3.8–5.2)
RBC # FLD: 13 % — SIGNIFICANT CHANGE UP (ref 10.3–14.5)
RH IG SCN BLD-IMP: POSITIVE — SIGNIFICANT CHANGE UP
SAO2 % BLDV: 76 % — SIGNIFICANT CHANGE UP (ref 60–85)
SODIUM SERPL-SCNC: 137 MMOL/L — SIGNIFICANT CHANGE UP (ref 135–145)
WBC # BLD: 9.75 K/UL — SIGNIFICANT CHANGE UP (ref 3.8–10.5)
WBC # FLD AUTO: 9.75 K/UL — SIGNIFICANT CHANGE UP (ref 3.8–10.5)

## 2017-09-26 PROCEDURE — 99284 EMERGENCY DEPT VISIT MOD MDM: CPT

## 2017-09-26 PROCEDURE — 76830 TRANSVAGINAL US NON-OB: CPT | Mod: 26

## 2017-09-26 RX ORDER — ONDANSETRON 8 MG/1
4 TABLET, FILM COATED ORAL ONCE
Qty: 0 | Refills: 0 | Status: COMPLETED | OUTPATIENT
Start: 2017-09-26 | End: 2017-09-26

## 2017-09-26 RX ORDER — SODIUM CHLORIDE 9 MG/ML
1000 INJECTION INTRAMUSCULAR; INTRAVENOUS; SUBCUTANEOUS ONCE
Qty: 0 | Refills: 0 | Status: COMPLETED | OUTPATIENT
Start: 2017-09-26 | End: 2017-09-26

## 2017-09-26 RX ORDER — ACETAMINOPHEN 500 MG
1000 TABLET ORAL ONCE
Qty: 0 | Refills: 0 | Status: COMPLETED | OUTPATIENT
Start: 2017-09-26 | End: 2017-09-26

## 2017-09-26 RX ADMIN — ONDANSETRON 4 MILLIGRAM(S): 8 TABLET, FILM COATED ORAL at 17:49

## 2017-09-26 RX ADMIN — Medication 400 MILLIGRAM(S): at 17:48

## 2017-09-26 RX ADMIN — SODIUM CHLORIDE 1000 MILLILITER(S): 9 INJECTION INTRAMUSCULAR; INTRAVENOUS; SUBCUTANEOUS at 17:49

## 2017-09-26 NOTE — ED ADULT TRIAGE NOTE - CHIEF COMPLAINT QUOTE
pt states that she is 8 weeks pregnant , c/o nausea and vomiting, pt states that she was discharged from this facility yesterday and did not  meds from pharmacy

## 2017-09-26 NOTE — ED PROVIDER NOTE - ATTENDING CONTRIBUTION TO CARE
I performed a history and physical exam of the patient and discussed their management with the resident. I reviewed the resident's note and agree with the documented findings and plan of care. My medical decision making and observations are found above.  Abd llq pain. dry appearing.

## 2017-09-26 NOTE — ED PROVIDER NOTE - OBJECTIVE STATEMENT
32F  7 weeks pregnant via ultrasound history of ectopic pregnancy s/p L saplingectomy. Patient presents with persistent nausea/vomiting x 5 days. Also complaining of LLQ pain. Patient was admitted to the hospital on 3 days ago for similar presentation and has unresolved symptoms. She reports being unable to keep down anything PO. Also reports subjective fevers/chills, general weakness. Denies cp, sob, urinary symptoms, diarrhea, vaginal bleeding/discharge, no other complaints. 32F  7 weeks pregnant via ultrasound history of ectopic pregnancy s/p L saplingectomy presents with persistent nausea/vomiting x 5 days. Also complaining of LLQ pain. Patient was admitted to the hospital on 3 days ago for similar presentation and has unresolved symptoms. Ultrasound from  shows IUP at 7weeks no adnexal fluid/masses. She reports being unable to keep down anything PO. Also reports subjective fevers/chills, general weakness. Denies cp, sob, urinary symptoms, diarrhea, vaginal bleeding/discharge, no other complaints.

## 2017-09-26 NOTE — ED PROVIDER NOTE - NS ED ROS FT
CONST: no fevers, no chills  EYES: no pain  ENT: no sore throat   CV: no chest pain  RESP: no shortness of breath  ABD: LLQ abd pain, +n/vomiting many episodes  : no dysuria  MSK: no back pain  NEURO: no headache or additional neurologic complaints  HEME: no easy bleeding  SKIN:  no rash

## 2017-09-26 NOTE — ED PROVIDER NOTE - MEDICAL DECISION MAKING DETAILS
Emily: 7wks pregant with IUP by us with vomitting, Patient with llq pain and vomiting on previous visit. Tx as hyperemisis and check UA. Pain significant enough to repeat US.

## 2017-09-26 NOTE — ED PROVIDER NOTE - PROGRESS NOTE DETAILS
LO Doherty- Pt feeling better after ER stay, US shows IUP, labs wnl. pt tolerating PO. stable for dc

## 2017-10-10 ENCOUNTER — APPOINTMENT (OUTPATIENT)
Dept: OBGYN | Facility: HOSPITAL | Age: 32
End: 2017-10-10

## 2018-01-30 ENCOUNTER — APPOINTMENT (OUTPATIENT)
Dept: OBGYN | Facility: CLINIC | Age: 33
End: 2018-01-30

## 2018-02-15 ENCOUNTER — APPOINTMENT (OUTPATIENT)
Dept: OBGYN | Facility: CLINIC | Age: 33
End: 2018-02-15

## 2018-03-06 ENCOUNTER — APPOINTMENT (OUTPATIENT)
Dept: OBGYN | Facility: CLINIC | Age: 33
End: 2018-03-06

## 2018-05-07 ENCOUNTER — EMERGENCY (EMERGENCY)
Facility: HOSPITAL | Age: 33
LOS: 1 days | Discharge: STILL ADMITTED OR ADMINISTRATIVE | End: 2018-05-07
Admitting: EMERGENCY MEDICINE

## 2018-05-07 ENCOUNTER — INPATIENT (INPATIENT)
Facility: HOSPITAL | Age: 33
LOS: 1 days | Discharge: HOME | End: 2018-05-09
Attending: OBSTETRICS & GYNECOLOGY | Admitting: OBSTETRICS & GYNECOLOGY
Payer: MEDICAID

## 2018-05-07 VITALS
SYSTOLIC BLOOD PRESSURE: 124 MMHG | OXYGEN SATURATION: 98 % | HEART RATE: 78 BPM | RESPIRATION RATE: 19 BRPM | DIASTOLIC BLOOD PRESSURE: 70 MMHG | TEMPERATURE: 98 F

## 2018-05-07 VITALS — WEIGHT: 178.57 LBS | HEIGHT: 61 IN

## 2018-05-07 DIAGNOSIS — Z90.79 ACQUIRED ABSENCE OF OTHER GENITAL ORGAN(S): Chronic | ICD-10-CM

## 2018-05-07 DIAGNOSIS — Z3A.00 WEEKS OF GESTATION OF PREGNANCY NOT SPECIFIED: ICD-10-CM

## 2018-05-07 DIAGNOSIS — Z98.891 HISTORY OF UTERINE SCAR FROM PREVIOUS SURGERY: Chronic | ICD-10-CM

## 2018-05-07 DIAGNOSIS — O26.899 OTHER SPECIFIED PREGNANCY RELATED CONDITIONS, UNSPECIFIED TRIMESTER: ICD-10-CM

## 2018-05-07 LAB
BASOPHILS # BLD AUTO: 0.02 K/UL — SIGNIFICANT CHANGE UP (ref 0–0.2)
BASOPHILS NFR BLD AUTO: 0.2 % — SIGNIFICANT CHANGE UP (ref 0–2)
BLD GP AB SCN SERPL QL: NEGATIVE — SIGNIFICANT CHANGE UP
EOSINOPHIL # BLD AUTO: 0.07 K/UL — SIGNIFICANT CHANGE UP (ref 0–0.5)
EOSINOPHIL NFR BLD AUTO: 0.6 % — SIGNIFICANT CHANGE UP (ref 0–6)
HBV SURFACE AG SER-ACNC: NEGATIVE — SIGNIFICANT CHANGE UP
HCT VFR BLD CALC: 31 % — LOW (ref 34.5–45)
HGB BLD-MCNC: 9.8 G/DL — LOW (ref 11.5–15.5)
HIV COMBO RESULT: SIGNIFICANT CHANGE UP
HIV1+2 AB SPEC QL: SIGNIFICANT CHANGE UP
IMM GRANULOCYTES # BLD AUTO: 0.08 # — SIGNIFICANT CHANGE UP
IMM GRANULOCYTES NFR BLD AUTO: 0.7 % — SIGNIFICANT CHANGE UP (ref 0–1.5)
LYMPHOCYTES # BLD AUTO: 1.58 K/UL — SIGNIFICANT CHANGE UP (ref 1–3.3)
LYMPHOCYTES # BLD AUTO: 14.5 % — SIGNIFICANT CHANGE UP (ref 13–44)
MCHC RBC-ENTMCNC: 25.4 PG — LOW (ref 27–34)
MCHC RBC-ENTMCNC: 31.6 % — LOW (ref 32–36)
MCV RBC AUTO: 80.3 FL — SIGNIFICANT CHANGE UP (ref 80–100)
MONOCYTES # BLD AUTO: 0.58 K/UL — SIGNIFICANT CHANGE UP (ref 0–0.9)
MONOCYTES NFR BLD AUTO: 5.3 % — SIGNIFICANT CHANGE UP (ref 2–14)
NEUTROPHILS # BLD AUTO: 8.59 K/UL — HIGH (ref 1.8–7.4)
NEUTROPHILS NFR BLD AUTO: 78.7 % — HIGH (ref 43–77)
NRBC # FLD: 0 — SIGNIFICANT CHANGE UP
PLATELET # BLD AUTO: 247 K/UL — SIGNIFICANT CHANGE UP (ref 150–400)
PMV BLD: 12.1 FL — SIGNIFICANT CHANGE UP (ref 7–13)
RBC # BLD: 3.86 M/UL — SIGNIFICANT CHANGE UP (ref 3.8–5.2)
RBC # FLD: 14.6 % — HIGH (ref 10.3–14.5)
RH IG SCN BLD-IMP: POSITIVE — SIGNIFICANT CHANGE UP
WBC # BLD: 10.92 K/UL — HIGH (ref 3.8–10.5)
WBC # FLD AUTO: 10.92 K/UL — HIGH (ref 3.8–10.5)

## 2018-05-07 PROCEDURE — 59409 OBSTETRICAL CARE: CPT | Mod: U9,UB,GC

## 2018-05-07 RX ORDER — LANOLIN
1 OINTMENT (GRAM) TOPICAL EVERY 6 HOURS
Qty: 0 | Refills: 0 | Status: DISCONTINUED | OUTPATIENT
Start: 2018-05-07 | End: 2018-05-09

## 2018-05-07 RX ORDER — DIPHENHYDRAMINE HCL 50 MG
25 CAPSULE ORAL EVERY 6 HOURS
Qty: 0 | Refills: 0 | Status: DISCONTINUED | OUTPATIENT
Start: 2018-05-07 | End: 2018-05-09

## 2018-05-07 RX ORDER — HYDROCORTISONE 1 %
1 OINTMENT (GRAM) TOPICAL EVERY 4 HOURS
Qty: 0 | Refills: 0 | Status: DISCONTINUED | OUTPATIENT
Start: 2018-05-07 | End: 2018-05-09

## 2018-05-07 RX ORDER — OXYCODONE HYDROCHLORIDE 5 MG/1
5 TABLET ORAL
Qty: 0 | Refills: 0 | Status: DISCONTINUED | OUTPATIENT
Start: 2018-05-07 | End: 2018-05-09

## 2018-05-07 RX ORDER — ACETAMINOPHEN 500 MG
650 TABLET ORAL EVERY 6 HOURS
Qty: 0 | Refills: 0 | Status: DISCONTINUED | OUTPATIENT
Start: 2018-05-07 | End: 2018-05-07

## 2018-05-07 RX ORDER — OXYCODONE AND ACETAMINOPHEN 5; 325 MG/1; MG/1
2 TABLET ORAL EVERY 6 HOURS
Qty: 0 | Refills: 0 | Status: DISCONTINUED | OUTPATIENT
Start: 2018-05-07 | End: 2018-05-07

## 2018-05-07 RX ORDER — MAGNESIUM HYDROXIDE 400 MG/1
30 TABLET, CHEWABLE ORAL
Qty: 0 | Refills: 0 | Status: DISCONTINUED | OUTPATIENT
Start: 2018-05-07 | End: 2018-05-09

## 2018-05-07 RX ORDER — OXYTOCIN 10 UNIT/ML
333.33 VIAL (ML) INJECTION
Qty: 20 | Refills: 0 | Status: COMPLETED | OUTPATIENT
Start: 2018-05-07

## 2018-05-07 RX ORDER — OXYCODONE HYDROCHLORIDE 5 MG/1
5 TABLET ORAL EVERY 4 HOURS
Qty: 0 | Refills: 0 | Status: DISCONTINUED | OUTPATIENT
Start: 2018-05-07 | End: 2018-05-09

## 2018-05-07 RX ORDER — AER TRAVELER 0.5 G/1
1 SOLUTION RECTAL; TOPICAL EVERY 4 HOURS
Qty: 0 | Refills: 0 | Status: DISCONTINUED | OUTPATIENT
Start: 2018-05-07 | End: 2018-05-07

## 2018-05-07 RX ORDER — HYDROCORTISONE 1 %
1 OINTMENT (GRAM) TOPICAL EVERY 4 HOURS
Qty: 0 | Refills: 0 | Status: DISCONTINUED | OUTPATIENT
Start: 2018-05-07 | End: 2018-05-07

## 2018-05-07 RX ORDER — ACETAMINOPHEN 500 MG
975 TABLET ORAL EVERY 6 HOURS
Qty: 0 | Refills: 0 | Status: COMPLETED | OUTPATIENT
Start: 2018-05-07 | End: 2019-04-05

## 2018-05-07 RX ORDER — OXYTOCIN 10 UNIT/ML
333.33 VIAL (ML) INJECTION
Qty: 20 | Refills: 0 | Status: COMPLETED | OUTPATIENT
Start: 2018-05-07 | End: 2018-05-07

## 2018-05-07 RX ORDER — SIMETHICONE 80 MG/1
80 TABLET, CHEWABLE ORAL EVERY 6 HOURS
Qty: 0 | Refills: 0 | Status: DISCONTINUED | OUTPATIENT
Start: 2018-05-07 | End: 2018-05-09

## 2018-05-07 RX ORDER — KETOROLAC TROMETHAMINE 30 MG/ML
30 SYRINGE (ML) INJECTION ONCE
Qty: 0 | Refills: 0 | Status: DISCONTINUED | OUTPATIENT
Start: 2018-05-07 | End: 2018-05-09

## 2018-05-07 RX ORDER — AER TRAVELER 0.5 G/1
1 SOLUTION RECTAL; TOPICAL EVERY 4 HOURS
Qty: 0 | Refills: 0 | Status: DISCONTINUED | OUTPATIENT
Start: 2018-05-07 | End: 2018-05-09

## 2018-05-07 RX ORDER — IBUPROFEN 200 MG
600 TABLET ORAL EVERY 6 HOURS
Qty: 0 | Refills: 0 | Status: COMPLETED | OUTPATIENT
Start: 2018-05-07 | End: 2019-04-05

## 2018-05-07 RX ORDER — DOCUSATE SODIUM 100 MG
100 CAPSULE ORAL
Qty: 0 | Refills: 0 | Status: DISCONTINUED | OUTPATIENT
Start: 2018-05-07 | End: 2018-05-09

## 2018-05-07 RX ORDER — OXYTOCIN 10 UNIT/ML
41.67 VIAL (ML) INJECTION
Qty: 20 | Refills: 0 | Status: DISCONTINUED | OUTPATIENT
Start: 2018-05-07 | End: 2018-05-07

## 2018-05-07 RX ORDER — SODIUM CHLORIDE 9 MG/ML
3 INJECTION INTRAMUSCULAR; INTRAVENOUS; SUBCUTANEOUS EVERY 8 HOURS
Qty: 0 | Refills: 0 | Status: DISCONTINUED | OUTPATIENT
Start: 2018-05-07 | End: 2018-05-09

## 2018-05-07 RX ORDER — OXYTOCIN 10 UNIT/ML
41.67 VIAL (ML) INJECTION
Qty: 20 | Refills: 0 | Status: DISCONTINUED | OUTPATIENT
Start: 2018-05-07 | End: 2018-05-08

## 2018-05-07 RX ORDER — SODIUM CHLORIDE 9 MG/ML
1000 INJECTION, SOLUTION INTRAVENOUS ONCE
Qty: 0 | Refills: 0 | Status: DISCONTINUED | OUTPATIENT
Start: 2018-05-07 | End: 2018-05-07

## 2018-05-07 RX ORDER — DIBUCAINE 1 %
1 OINTMENT (GRAM) RECTAL EVERY 4 HOURS
Qty: 0 | Refills: 0 | Status: DISCONTINUED | OUTPATIENT
Start: 2018-05-07 | End: 2018-05-07

## 2018-05-07 RX ORDER — INFLUENZA VIRUS VACCINE 15; 15; 15; 15 UG/.5ML; UG/.5ML; UG/.5ML; UG/.5ML
0.5 SUSPENSION INTRAMUSCULAR ONCE
Qty: 0 | Refills: 0 | Status: DISCONTINUED | OUTPATIENT
Start: 2018-05-07 | End: 2018-05-09

## 2018-05-07 RX ORDER — IBUPROFEN 200 MG
600 TABLET ORAL EVERY 6 HOURS
Qty: 0 | Refills: 0 | Status: DISCONTINUED | OUTPATIENT
Start: 2018-05-07 | End: 2018-05-07

## 2018-05-07 RX ORDER — SODIUM CHLORIDE 9 MG/ML
1000 INJECTION, SOLUTION INTRAVENOUS
Qty: 0 | Refills: 0 | Status: DISCONTINUED | OUTPATIENT
Start: 2018-05-07 | End: 2018-05-07

## 2018-05-07 RX ORDER — CITRIC ACID/SODIUM CITRATE 300-500 MG
15 SOLUTION, ORAL ORAL EVERY 4 HOURS
Qty: 0 | Refills: 0 | Status: DISCONTINUED | OUTPATIENT
Start: 2018-05-07 | End: 2018-05-07

## 2018-05-07 RX ORDER — TETANUS TOXOID, REDUCED DIPHTHERIA TOXOID AND ACELLULAR PERTUSSIS VACCINE, ADSORBED 5; 2.5; 8; 8; 2.5 [IU]/.5ML; [IU]/.5ML; UG/.5ML; UG/.5ML; UG/.5ML
0.5 SUSPENSION INTRAMUSCULAR ONCE
Qty: 0 | Refills: 0 | Status: DISCONTINUED | OUTPATIENT
Start: 2018-05-07 | End: 2018-05-09

## 2018-05-07 RX ORDER — PRAMOXINE HYDROCHLORIDE 150 MG/15G
1 AEROSOL, FOAM RECTAL EVERY 4 HOURS
Qty: 0 | Refills: 0 | Status: DISCONTINUED | OUTPATIENT
Start: 2018-05-07 | End: 2018-05-09

## 2018-05-07 RX ORDER — PRAMOXINE HYDROCHLORIDE 150 MG/15G
1 AEROSOL, FOAM RECTAL EVERY 4 HOURS
Qty: 0 | Refills: 0 | Status: DISCONTINUED | OUTPATIENT
Start: 2018-05-07 | End: 2018-05-07

## 2018-05-07 RX ORDER — SODIUM CHLORIDE 9 MG/ML
3 INJECTION INTRAMUSCULAR; INTRAVENOUS; SUBCUTANEOUS EVERY 8 HOURS
Qty: 0 | Refills: 0 | Status: DISCONTINUED | OUTPATIENT
Start: 2018-05-07 | End: 2018-05-07

## 2018-05-07 RX ORDER — DIBUCAINE 1 %
1 OINTMENT (GRAM) RECTAL EVERY 4 HOURS
Qty: 0 | Refills: 0 | Status: DISCONTINUED | OUTPATIENT
Start: 2018-05-07 | End: 2018-05-09

## 2018-05-07 RX ORDER — GLYCERIN ADULT
1 SUPPOSITORY, RECTAL RECTAL AT BEDTIME
Qty: 0 | Refills: 0 | Status: DISCONTINUED | OUTPATIENT
Start: 2018-05-07 | End: 2018-05-09

## 2018-05-07 RX ADMIN — SODIUM CHLORIDE 3 MILLILITER(S): 9 INJECTION INTRAMUSCULAR; INTRAVENOUS; SUBCUTANEOUS at 22:30

## 2018-05-07 RX ADMIN — Medication 125 MILLIUNIT(S)/MIN: at 19:02

## 2018-05-07 RX ADMIN — Medication 1000 MILLIUNIT(S)/MIN: at 19:02

## 2018-05-08 ENCOUNTER — TRANSCRIPTION ENCOUNTER (OUTPATIENT)
Age: 33
End: 2018-05-08

## 2018-05-08 LAB
RUBV IGG SER-ACNC: 9 INDEX — SIGNIFICANT CHANGE UP
RUBV IGG SER-IMP: POSITIVE — SIGNIFICANT CHANGE UP
T PALLIDUM AB TITR SER: NEGATIVE — SIGNIFICANT CHANGE UP

## 2018-05-08 RX ORDER — IBUPROFEN 200 MG
1 TABLET ORAL
Qty: 0 | Refills: 0 | COMMUNITY
Start: 2018-05-08

## 2018-05-08 RX ORDER — ACETAMINOPHEN 500 MG
975 TABLET ORAL EVERY 6 HOURS
Qty: 0 | Refills: 0 | Status: DISCONTINUED | OUTPATIENT
Start: 2018-05-08 | End: 2018-05-09

## 2018-05-08 RX ORDER — ACETAMINOPHEN 500 MG
3 TABLET ORAL
Qty: 0 | Refills: 0 | COMMUNITY
Start: 2018-05-08

## 2018-05-08 RX ORDER — IBUPROFEN 200 MG
600 TABLET ORAL EVERY 6 HOURS
Qty: 0 | Refills: 0 | Status: DISCONTINUED | OUTPATIENT
Start: 2018-05-08 | End: 2018-05-09

## 2018-05-08 RX ORDER — DOCUSATE SODIUM 100 MG
1 CAPSULE ORAL
Qty: 0 | Refills: 0 | COMMUNITY
Start: 2018-05-08

## 2018-05-08 NOTE — PROGRESS NOTE ADULT - SUBJECTIVE AND OBJECTIVE BOX
Patient seen and examined at bedside, no acute overnight events. No acute complaints, pain well controlled. Patient is ambulating, voiding spontaneously, passing gas, and tolerating regular diet.    Vital Signs Last 24 Hours  T(C): 36.7 (05-08-18 @ 05:33), Max: 37.3 (05-08-18 @ 01:46)  HR: 75 (05-08-18 @ 05:33) (75 - 97)  BP: 108/69 (05-08-18 @ 05:33) (108/69 - 129/62)  RR: 18 (05-08-18 @ 05:33) (18 - 19)  SpO2: 99% (05-08-18 @ 05:33) (98% - 100%)    Physical Exam:  General: NAD  Abdomen: Soft, non-tender, non-distended, fundus firm  Pelvic: Lochia wnl    Labs:    Blood Type: B Positive  Antibody Screen: Negative  Rubella IgG: Positive (Positive=Immune, Negative=Non-Immune)  RPR: Negative               9.8    10.92 )-----------( 247      ( 05-07 @ 14:48 )             31.0         MEDICATIONS  (STANDING):  acetaminophen   Tablet. 975 milliGRAM(s) Oral every 6 hours  diphtheria/tetanus/pertussis (acellular) Vaccine (ADAcel) 0.5 milliLiter(s) IntraMuscular once  ibuprofen  Tablet 600 milliGRAM(s) Oral every 6 hours  influenza   Vaccine 0.5 milliLiter(s) IntraMuscular once  ketorolac   Injectable 30 milliGRAM(s) IV Push once  oxyCODONE    IR 5 milliGRAM(s) Oral every 3 hours  oxytocin Infusion 41.667 milliUNIT(s)/Min (125 mL/Hr) IV Continuous <Continuous>  prenatal multivitamin 1 Tablet(s) Oral daily  sodium chloride 0.9% lock flush 3 milliLiter(s) IV Push every 8 hours    MEDICATIONS  (PRN):  dibucaine 1% Ointment 1 Application(s) Topical every 4 hours PRN Perineal Discomfort  diphenhydrAMINE   Capsule 25 milliGRAM(s) Oral every 6 hours PRN Itching  docusate sodium 100 milliGRAM(s) Oral two times a day PRN Stool Softening  glycerin Suppository - Adult 1 Suppository(s) Rectal at bedtime PRN Constipation  hydrocortisone 1% Cream 1 Application(s) Topical every 4 hours PRN Moderate to Severe Perineal Pain  lanolin Ointment 1 Application(s) Topical every 6 hours PRN Sore Nipples  magnesium hydroxide Suspension 30 milliLiter(s) Oral two times a day PRN Constipation  oxyCODONE    IR 5 milliGRAM(s) Oral every 4 hours PRN Severe Pain (7 -10)  pramoxine 1%/zinc 5% Cream 1 Application(s) Topical every 4 hours PRN Moderate to Severe Perineal Pain  simethicone 80 milliGRAM(s) Chew every 6 hours PRN Gas  witch hazel Pads 1 Application(s) Topical every 4 hours PRN Perineal Discomfort

## 2018-05-08 NOTE — DISCHARGE NOTE OB - PLAN OF CARE
Recovery Make your follow-up appointment with your doctor in 2 weeks after delivery for an incision check and 6 wks after discharge for postpartum visit. No heavy lifting, driving, or strenuous activity for 6 weeks. Nothing per vagina such as tampons, intercourse, douches or tub baths for 6 weeks or until you see your doctor. Call your doctor with any signs and symptoms of infection such as fever, chills, nausea, or vomiting. Call your doctor with redness or swelling at the incision site, fluid leakage, or wound separation. Call your doctor if you're unable to tolerate food, have an increase in vaginal bleeding, or have difficulty urinating. Call your doctor if you have pain that is not relieved by your prescribed medications. Notify your doctor with any other concerns.  Ambulatory Clinic Unit   [facility], Oncology Building  [address] [place] [address], [address]rd [place] [address]  Phone: [phone]

## 2018-05-08 NOTE — PROGRESS NOTE ADULT - PROBLEM SELECTOR PLAN 1
- Continue with po analgesia  - Increase ambulation  - Continue regular diet  - IV lock    Alessandro Roe MD PGY1

## 2018-05-08 NOTE — DISCHARGE NOTE OB - MEDICATION SUMMARY - MEDICATIONS TO STOP TAKING
I will STOP taking the medications listed below when I get home from the hospital:  None I will STOP taking the medications listed below when I get home from the hospital:    Zofran 4 mg oral tablet  -- 1 tab(s) by mouth every 8 hours, As Needed    promethazine 12.5 mg oral tablet  -- 1 tab(s) by mouth every 6 hours, As Needed   -- May cause drowsiness.  Alcohol may intensify this effect.  Use care when operating dangerous machinery.  Obtain medical advice before taking any non-prescription drugs as some may affect the action of this medication.

## 2018-05-08 NOTE — DISCHARGE NOTE OB - HOSPITAL COURSE
Patient had an uncomplicated  followed by an uncomplicated postpartum course. EBL: 300. Hct: 31.0.  On postpartum day 2, patient was discharged home in stable condition, voiding spontaneously and with normal vital signs.

## 2018-05-08 NOTE — DISCHARGE NOTE OB - ADDITIONAL INSTRUCTIONS
Please call for  follow up  postpartum visit within 4-6  weeks of delivery date,  at Ambulatory Clinic Unit : [facility] :  [facility], 3rd floor : phone # [phone] or walk-in hours are: MONDAY 3-6 pm, WEDNESDAY 3-6 pm, FRIDAY 9-11 am, 1-3 pm

## 2018-05-08 NOTE — DISCHARGE NOTE OB - PROVIDER TOKENS
FREE:[LAST:[[facility]],PHONE:[phone]],FAX:[(   [phone]    -],ADDRESS:[[phone] [place] [address], [address] [place] [address]]]

## 2018-05-08 NOTE — DISCHARGE NOTE OB - CARE PLAN
Principal Discharge DX:	Vaginal delivery  Goal:	Recovery  Assessment and plan of treatment:	Make your follow-up appointment with your doctor in 2 weeks after delivery for an incision check and 6 wks after discharge for postpartum visit. No heavy lifting, driving, or strenuous activity for 6 weeks. Nothing per vagina such as tampons, intercourse, douches or tub baths for 6 weeks or until you see your doctor. Call your doctor with any signs and symptoms of infection such as fever, chills, nausea, or vomiting. Call your doctor with redness or swelling at the incision site, fluid leakage, or wound separation. Call your doctor if you're unable to tolerate food, have an increase in vaginal bleeding, or have difficulty urinating. Call your doctor if you have pain that is not relieved by your prescribed medications. Notify your doctor with any other concerns.  Ambulatory Clinic Unit   [facility], Oncology Building  [address] [place] [address], 3rd [place] [address]  Phone: [phone]

## 2018-05-08 NOTE — DISCHARGE NOTE OB - PATIENT PORTAL LINK FT
You can access the [facility] Patient Portal, offered by [facility], by registering with the following website: [email or web address]

## 2018-05-08 NOTE — DISCHARGE NOTE OB - MEDICATION SUMMARY - MEDICATIONS TO TAKE
I will START or STAY ON the medications listed below when I get home from the hospital:    acetaminophen 325 mg oral tablet  -- 3 tab(s) by mouth every 6 hours  -- Indication: For Pain    ibuprofen 600 mg oral tablet  -- 1 tab(s) by mouth every 6 hours  -- Indication: For Pain    Prenatal Multivitamins with Folic Acid 1 mg oral tablet  -- 1 tab(s) by mouth once a day  -- Indication: For Vitamin    docusate sodium 100 mg oral capsule  -- 1 cap(s) by mouth 2 times a day, As needed, Stool Softening  -- Indication: For Stool softener

## 2018-05-09 VITALS
SYSTOLIC BLOOD PRESSURE: 98 MMHG | OXYGEN SATURATION: 97 % | DIASTOLIC BLOOD PRESSURE: 63 MMHG | TEMPERATURE: 98 F | HEART RATE: 72 BPM

## 2018-05-09 RX ORDER — HYDROCORTISONE 1 %
1 OINTMENT (GRAM) TOPICAL EVERY 4 HOURS
Qty: 0 | Refills: 0 | Status: DISCONTINUED | OUTPATIENT
Start: 2018-05-09 | End: 2018-05-09

## 2018-05-09 RX ORDER — PRAMOXINE HYDROCHLORIDE 150 MG/15G
1 AEROSOL, FOAM RECTAL EVERY 4 HOURS
Qty: 0 | Refills: 0 | Status: DISCONTINUED | OUTPATIENT
Start: 2018-05-09 | End: 2018-05-09

## 2018-05-09 NOTE — PROGRESS NOTE ADULT - PROBLEM SELECTOR PLAN 1
- Continue with po analgesia  - Increase ambulation  - Continue regular diet  - IV lock  - discharge home    Alessandro Roe MD PGY1

## 2018-05-09 NOTE — PROGRESS NOTE ADULT - SUBJECTIVE AND OBJECTIVE BOX
Patient seen and examined at bedside, no acute overnight events. No acute complaints, pain well controlled. Patient is ambulating, voiding spontaneously, passing gas, and tolerating regular diet.    Vital Signs Last 24 Hours  T(C): 36.8 (05-09-18 @ 05:11), Max: 37.1 (05-08-18 @ 17:36)  HR: 72 (05-09-18 @ 05:11) (72 - 80)  BP: 98/63 (05-09-18 @ 05:11) (98/63 - 122/88)  RR: 18 (05-08-18 @ 17:36) (18 - 18)  SpO2: 97% (05-09-18 @ 05:11) (97% - 99%)    Physical Exam:  General: NAD  Abdomen: Soft, non-tender, non-distended, fundus firm  Pelvic: Lochia wnl    Labs:    Blood Type: B Positive  Antibody Screen: Negative  Rubella IgG: Positive (Positive=Immune, Negative=Non-Immune)  RPR: Negative               9.8    10.92 )-----------( 247      ( 05-07 @ 14:48 )             31.0         MEDICATIONS  (STANDING):  acetaminophen   Tablet. 975 milliGRAM(s) Oral every 6 hours  diphtheria/tetanus/pertussis (acellular) Vaccine (ADAcel) 0.5 milliLiter(s) IntraMuscular once  ibuprofen  Tablet 600 milliGRAM(s) Oral every 6 hours  influenza   Vaccine 0.5 milliLiter(s) IntraMuscular once  oxyCODONE    IR 5 milliGRAM(s) Oral every 3 hours  prenatal multivitamin 1 Tablet(s) Oral daily  sodium chloride 0.9% lock flush 3 milliLiter(s) IV Push every 8 hours    MEDICATIONS  (PRN):  dibucaine 1% Ointment 1 Application(s) Topical every 4 hours PRN Perineal Discomfort  diphenhydrAMINE   Capsule 25 milliGRAM(s) Oral every 6 hours PRN Itching  docusate sodium 100 milliGRAM(s) Oral two times a day PRN Stool Softening  glycerin Suppository - Adult 1 Suppository(s) Rectal at bedtime PRN Constipation  hydrocortisone 1% Cream 1 Application(s) Topical every 4 hours PRN perineal discomfort  lanolin Ointment 1 Application(s) Topical every 6 hours PRN Sore Nipples  magnesium hydroxide Suspension 30 milliLiter(s) Oral two times a day PRN Constipation  oxyCODONE    IR 5 milliGRAM(s) Oral every 4 hours PRN Severe Pain (7 -10)  pramoxine 1%/zinc 5% Cream 1 Application(s) Topical every 4 hours PRN perineal discomfort  simethicone 80 milliGRAM(s) Chew every 6 hours PRN Gas  witch hazel Pads 1 Application(s) Topical every 4 hours PRN Perineal Discomfort

## 2018-07-28 PROBLEM — R51 HEADACHE, UNSPECIFIED HEADACHE TYPE: Status: ACTIVE | Noted: 2017-07-18

## 2018-11-09 NOTE — ED PROVIDER NOTE - ENMT, MLM
Airway patent, Nasal mucosa clear. Mouth with normal mucosa. Throat has no vesicles, no oropharyngeal exudates and uvula is midline. Nutrition Education/Vitamin/Mineral

## 2019-01-09 NOTE — ED ADULT NURSE NOTE - CAS EDN DISCHARGE INTERVENTIONS
Problem: VTE, Risk for  Goal: # No s/s of VTE  Outcome: Outcome Met, Continue evaluating goal progress toward completion  No report of sudden onset CP, SOB, dyspnea, or deep muscle pain, with or without movement. Problem: At Risk for Falls  Goal: # Patient does not fall  Outcome: Outcome Met, Continue evaluating goal progress toward completion  Pt did not fall during shift. All safgety measures in place, and pt is cooperative and compliant in use of them, once she understood her safety. IV discontinued, cath removed intact

## 2019-05-14 NOTE — ED PROVIDER NOTE - CPE EDP SKIN NORM
This note was completed with dictation software and grammatical errors may exist.    CC: Back pain, right anterior thigh pain    HPI:  The patient is a 67-year-old woman with a history of hypertension, presents in referral [facility] for back and right anterior thigh pain. The patient reports that her thigh pain began 2-3 months ago out of the blue, denies any trauma or incidents which may have caused the pain. It has become more constant and more severe.  It is located across the anterior thigh from the lateral hip down to the knee and she describes it as burning, throbbing, aching, numb, sharp, shooting and tingling.  She states that it is fairly constant and it does not seem that anything specifically makes it better or worse but she does report slight worsening at times with sitting too long, standing too long, lying down too long or with walking.  While she denies any major numbness or constant pain below her knee, she does report jolts of pain that will radiate along the anterior thigh to the ankle but not into the foot.  She denies any weakness in the leg.  She was seen by Maine Gomez who had ordered an MRI and since the findings were not all that impressive, she has set her up for any EMG and also sent orders for physical therapy.  The patient does not seem to recall any physical therapy or EMG being set up but I explained to her that she has these appointments.  She seems to have a low health literacy level though.    Pain intervention history:  She had taken hydrocodone without much relief, has tried ibuprofen, Tylenol without relief.  She tried Flexeril without much relief.  She has been doing some exercises on her own at home, trying to walk without relief of pain.    ROS:  She reports fatigability, weight gain, itching, vision change, eye pain, runny nose, shortness of breath, chest pain, cough, stomach pain, easy bruising, back pain, difficulty sleeping and  "anxiety.  Balance of review of systems is negative.    History reviewed. No pertinent past medical history.    Past Surgical History:   Procedure Laterality Date    HYSTERECTOMY         Social History     Socioeconomic History    Marital status: Single     Spouse name: Not on file    Number of children: Not on file    Years of education: Not on file    Highest education level: Not on file   Occupational History    Not on file   Social Needs    Financial resource strain: Not on file    Food insecurity:     Worry: Not on file     Inability: Not on file    Transportation needs:     Medical: Not on file     Non-medical: Not on file   Tobacco Use    Smoking status: Never Smoker    Smokeless tobacco: Never Used   Substance and Sexual Activity    Alcohol use: Yes     Alcohol/week: 0.0 oz     Comment: Socially    Drug use: No    Sexual activity: Not on file   Lifestyle    Physical activity:     Days per week: Not on file     Minutes per session: Not on file    Stress: Not on file   Relationships    Social connections:     Talks on phone: Not on file     Gets together: Not on file     Attends Rastafarian service: Not on file     Active member of club or organization: Not on file     Attends meetings of clubs or organizations: Not on file     Relationship status: Not on file   Other Topics Concern    Not on file   Social History Narrative    Not on file         Medications/Allergies: See med card    Vitals:    05/14/19 0848   BP: 106/68   Pulse: 75   Weight: 103.9 kg (229 lb 0.9 oz)   Height: 5' 6" (1.676 m)   PainSc:   8   PainLoc: Back     Body mass index is 36.97 kg/m².        Physical exam:  Gen: A and O x3, pleasant, well-groomed  Skin: No rashes or obvious lesions  HEENT: PERRLA, no obvious deformities on ears or in canals. Trachea midline.  CVS: Regular rate and rhythm, normal palpable pulses.  Resp:No increased work of breathing, symmetrical chest rise.  Abdomen: Soft, NT/ND.  Musculoskeletal: . No " antalgic gait.     Neuro:  Lower extremities: 5/5 strength bilaterally, except for right knee extension and hip flexion 4/5 primarily secondary to pain  Reflexes: Patellar 0+, Achilles 0+ bilaterally.  Sensory:  Intact and symmetrical to light touch and pinprick in L2-S1 dermatomes bilaterally except for anterior thigh on the right side decreased sensation to light touch and pinprick but also hyperalgesia in the same area.    Lumbar spine:  Lumbar spine:  Range of motion is full with flexion without increased pain, mildly reduced with extension with increased pain especially on right oblique extension causing right low back pain.  Andre's test causes no increased pain on either side.    Supine straight leg raise is negative bilaterally.    Internal and external rotation of the hip causes no increased pain on either side.  Myofascial exam: No tenderness to palpation across lumbar paraspinous muscles.    Imagin/10/19 MRI L-spine:  The conus terminates at L1 and has an unremarkable appearance.  There is diffuse disc desiccation.  There is loss of disc space height at L2/L3 and L3/L4.  There is mild anterior osteophyte formation throughout the lumbar region.  The lumbar vertebrae otherwise display normal signal, morphology and alignment.  The individual disc levels appears follows:  T12/L1, L1/L2: There is no evidence of disc protrusion, canal or foraminal stenosis at these levels.  L2/L3: Minimal degenerative facet changes are present and there is no evidence of significant canal or foraminal stenosis.  L3/L4: There is slight disc bulging and mild degenerative facet changes are noted without evidence of significant canal or foraminal stenosis.  L4/L5: Annular disc bulging is evident with a small left posterolateral disc protrusion.  There is mild narrowing the left foramen.  The central canal is mildly distorted and the right foramen is mildly narrowed.  L5/S1: Degenerative facet changes are noted and there is  no evidence of significant canal or foraminal stenosis.    4/10/19 Xray L-spine:  Lower lumbar degenerative facet changes are present.  There is mild anterior osteophyte formation throughout the lumbar region.  There is no evidence of abnormal motion upon flexion and extension.  There is a slight retrolisthesis of L2 relative to L3 there is a slight dextroscoliosis.    Assessment:  The patient is a 67-year-old woman with a history of hypertension, presents in referral [facility] for back and right anterior thigh pain.   1. Lumbar radiculopathy  Vital signs    Verify informed consent    Notify physician     Notify physician     Notify physician (specify)    Diet NPO    Case Request Operating Room: Injection,steroid,epidural,transforaminal approach L4/5    Place in Outpatient    alprazolam ODT dissolvable tablet 1 mg   2. DDD (degenerative disc disease), lumbar           Plan:  1.  From her pain symptomatology, I expected to see right-sided L3 nerve root impingement but explained that this is not present.  There is some foraminal narrowing out to the right side and may be even some extra foraminal narrowing but again, not impressive.  I was trying to explain why this can sometimes be difficult to make a diagnosis when she got up and started walking out of the room saying that I was not planning to help her and she would just go somewhere else.  I explained that I just had not yet gotten to the part of the discussion where we I would talk about treatment for this.  I was able to calm her down but this was rather unusual.  2.  She is specifically asking for an injection, does not want any other treatments or tests.  I explained that we could try an L4/5 transforaminal injection on the right side for diagnostic and therapeutic purposes and she would like to proceed with this.  I explained that if she does not get relief with this, it is most likely not coming from the spine and instead  may be a case of meralgia paresthetica.  In the meantime, I do think she would benefit from gabapentin and she agreed to start taking 300 mg nightly.  3.  I explained that the EMG may be helpful but she complains that she does not want to spend any more money on tests.  I will have her follow up in several weeks after the injection or sooner as needed.    Thank you for referring this interesting patient, and I look forward to continuing to collaborate in her care.       normal...

## 2020-07-29 ENCOUNTER — EMERGENCY (EMERGENCY)
Facility: HOSPITAL | Age: 35
LOS: 1 days | Discharge: HOME | End: 2020-07-29
Attending: EMERGENCY MEDICINE
Payer: MEDICAID

## 2020-07-29 VITALS
WEIGHT: 160.94 LBS | SYSTOLIC BLOOD PRESSURE: 110 MMHG | HEIGHT: 62 IN | HEART RATE: 77 BPM | DIASTOLIC BLOOD PRESSURE: 79 MMHG | TEMPERATURE: 98 F | RESPIRATION RATE: 16 BRPM | OXYGEN SATURATION: 98 %

## 2020-07-29 VITALS
OXYGEN SATURATION: 99 % | SYSTOLIC BLOOD PRESSURE: 117 MMHG | RESPIRATION RATE: 16 BRPM | DIASTOLIC BLOOD PRESSURE: 84 MMHG | HEART RATE: 90 BPM

## 2020-07-29 DIAGNOSIS — Z90.79 ACQUIRED ABSENCE OF OTHER GENITAL ORGAN(S): Chronic | ICD-10-CM

## 2020-07-29 DIAGNOSIS — Z98.891 HISTORY OF UTERINE SCAR FROM PREVIOUS SURGERY: Chronic | ICD-10-CM

## 2020-07-29 LAB
APPEARANCE UR: ABNORMAL
BILIRUB UR-MCNC: NEGATIVE — SIGNIFICANT CHANGE UP
COLOR SPEC: YELLOW — SIGNIFICANT CHANGE UP
DIFF PNL FLD: ABNORMAL
GLUCOSE UR QL: NEGATIVE — SIGNIFICANT CHANGE UP
HCG UR QL: NEGATIVE — SIGNIFICANT CHANGE UP
HIV 1 & 2 AB SERPL IA.RAPID: SIGNIFICANT CHANGE UP
KETONES UR-MCNC: ABNORMAL
LEUKOCYTE ESTERASE UR-ACNC: ABNORMAL
NITRITE UR-MCNC: NEGATIVE — SIGNIFICANT CHANGE UP
PH UR: 5 — SIGNIFICANT CHANGE UP (ref 5–8)
PROT UR-MCNC: 15
SP GR SPEC: 1.02 — SIGNIFICANT CHANGE UP (ref 1.01–1.02)
T PALLIDUM AB TITR SER: NEGATIVE — SIGNIFICANT CHANGE UP
UROBILINOGEN FLD QL: 1

## 2020-07-29 PROCEDURE — 86780 TREPONEMA PALLIDUM: CPT

## 2020-07-29 PROCEDURE — 81001 URINALYSIS AUTO W/SCOPE: CPT

## 2020-07-29 PROCEDURE — 87591 N.GONORRHOEAE DNA AMP PROB: CPT

## 2020-07-29 PROCEDURE — 81025 URINE PREGNANCY TEST: CPT

## 2020-07-29 PROCEDURE — 86703 HIV-1/HIV-2 1 RESULT ANTBDY: CPT

## 2020-07-29 PROCEDURE — 87491 CHLMYD TRACH DNA AMP PROBE: CPT

## 2020-07-29 PROCEDURE — 99283 EMERGENCY DEPT VISIT LOW MDM: CPT

## 2020-07-29 PROCEDURE — 96372 THER/PROPH/DIAG INJ SC/IM: CPT

## 2020-07-29 PROCEDURE — 36415 COLL VENOUS BLD VENIPUNCTURE: CPT

## 2020-07-29 PROCEDURE — 99283 EMERGENCY DEPT VISIT LOW MDM: CPT | Mod: 25

## 2020-07-29 RX ORDER — VALACYCLOVIR 500 MG/1
1 TABLET, FILM COATED ORAL
Qty: 14 | Refills: 0
Start: 2020-07-29 | End: 2020-08-04

## 2020-07-29 RX ORDER — CEFTRIAXONE 500 MG/1
250 INJECTION, POWDER, FOR SOLUTION INTRAMUSCULAR; INTRAVENOUS ONCE
Refills: 0 | Status: COMPLETED | OUTPATIENT
Start: 2020-07-29 | End: 2020-07-29

## 2020-07-29 RX ORDER — LIDOCAINE 4 G/100G
2 CREAM TOPICAL
Qty: 40 | Refills: 0
Start: 2020-07-29

## 2020-07-29 RX ORDER — AZITHROMYCIN 500 MG/1
1 TABLET, FILM COATED ORAL ONCE
Refills: 0 | Status: COMPLETED | OUTPATIENT
Start: 2020-07-29 | End: 2020-07-29

## 2020-07-29 RX ORDER — AZITHROMYCIN 500 MG/1
1000 TABLET, FILM COATED ORAL ONCE
Refills: 0 | Status: DISCONTINUED | OUTPATIENT
Start: 2020-07-29 | End: 2020-07-29

## 2020-07-29 RX ADMIN — AZITHROMYCIN 1 GRAM(S): 500 TABLET, FILM COATED ORAL at 15:16

## 2020-07-29 RX ADMIN — CEFTRIAXONE 250 MILLIGRAM(S): 500 INJECTION, POWDER, FOR SOLUTION INTRAMUSCULAR; INTRAVENOUS at 13:55

## 2020-07-29 NOTE — ED PROVIDER NOTE - PATIENT PORTAL LINK FT
You can access the FollowMyHealth Patient Portal offered by [facility] by registering at the following website: [email or web address]. By joining [facility]’s FollowMyHealth portal, you will also be able to view your health information using other applications (apps) compatible with our system.

## 2020-07-29 NOTE — ED PROVIDER NOTE - NSFOLLOWUPINSTRUCTIONS_ED_ALL_ED_FT
Genital Herpes Simplex    WHAT YOU NEED TO KNOW:    Genital herpes is a sexually transmitted infection (STI) that is caused by the herpes simplex virus (HSV). It is spread through oral, vaginal, or anal sex. It may be spread even if you do not see blisters. It can also be spread to other areas of your body, including your eyes, by touching open blisters. If you are pregnant, it may be spread to your baby while he is still in your womb or during vaginal delivery. Unprotected sex or sex with multiple partners increases your risk for genital herpes.    DISCHARGE INSTRUCTIONS:    Call 911 for any of the following:     You have trouble breathing.      You have a seizure.      Your neck is stiff.      You have trouble thinking clearly.    Contact your healthcare provider if:     You have chills or a fever.      You have painful blisters on your penis, vagina, anus, or mouth.      Fluid or blood is coming out of your genitals.      You have trouble urinating.      You think you are pregnant and you are bleeding from your vagina.      You have trouble chewing or swallowing.      Your symptoms do not get better, or they get worse, even after treatment.      You have questions or concerns about your condition or care.    Medicines:     Antivirals may help decrease your symptoms.       Numbing cream or ointment may help decrease pain.      NSAIDs, such as ibuprofen, help decrease swelling, pain, and fever. This medicine is available with or without a doctor's order. NSAIDs can cause stomach bleeding or kidney problems in certain people. If you take blood thinner medicine, always ask your healthcare provider if NSAIDs are safe for you. Always read the medicine label and follow directions.      Take your medicine as directed. Contact your healthcare provider if you think your medicine is not helping or if you have side effects. Tell him or her if you are allergic to any medicine. Keep a list of the medicines, vitamins, and herbs you take. Include the amounts, and when and why you take them. Bring the list or the pill bottles to follow-up visits. Carry your medicine list with you in case of an emergency.    Manage your symptoms: Do the following to be more comfortable when your infection is active:     Keep the blisters clean and dry. Wash them with soap and warm water, and pat dry gently.      Wear cotton underwear and loose clothing. This may help to keep the blisters dry and keep clothes from rubbing.      Apply ice on the area for 15 to 20 minutes every hour or as directed. Use an ice pack, or put crushed ice in a plastic bag. Cover it with a towel. Ice helps prevent tissue damage and decreases swelling and pain.      Apply heat on the area for 20 to 30 minutes every 2 hours for as many days as directed. A warm bath may also help. Heat helps decrease pain and muscle spasms.    Prevent the spread of genital herpes:     Use condoms. Use a latex condom when you have oral, genital, and anal sex. Use a new condom each time. Use a polyurethane condom if you are allergic to latex.      Try not to touch your blisters. Wash your hands before and after you touch the area. Do not kiss anyone if you have blisters around your mouth. Do not breastfeed if you have blisters on your breast.       Tell your partners that you have genital herpes. Do not have sex until he or she knows that you have genital herpes. Ask your healthcare provider for ways to tell partners about your infection.      Tell your healthcare providers that you have genital herpes. If you are pregnant, your baby may need special monitoring. Inform your healthcare provider of your condition to avoid spreading the infection to your baby.    Follow up with your healthcare provider as directed: Write down your questions so you remember to ask them during your visits.        © Copyright [employer] 2020       back to top                      © Copyright [employer] 2020

## 2020-07-29 NOTE — ED ADULT NURSE NOTE - OBJECTIVE STATEMENT
States she has vaginal itching ,discharge and skin lesions x5 days .Seen and examined by  ,with area of  skin lesions to vaginal area .

## 2020-07-29 NOTE — ED PROVIDER NOTE - OBJECTIVE STATEMENT
Eryn reports 5 days of vaginal itching and burning with skin lesions and discharge. No fever, cp, sob, ap, n/v/d. One male sexual partner for past 12 years, who also currently has lesions on his penis. Patient reports partner has h/o infidelity.

## 2020-07-29 NOTE — ED PROVIDER NOTE - CLINICAL SUMMARY MEDICAL DECISION MAKING FREE TEXT BOX
patient with primary herpes genitalis. Will perform broader STD testing, pregnancy test, and reassess.

## 2020-07-30 PROBLEM — O00.90 UNSPECIFIED ECTOPIC PREGNANCY WITHOUT INTRAUTERINE PREGNANCY: Chronic | Status: ACTIVE | Noted: 2017-09-22

## 2020-07-31 LAB
C TRACH RRNA SPEC QL NAA+PROBE: SIGNIFICANT CHANGE UP
N GONORRHOEA RRNA SPEC QL NAA+PROBE: SIGNIFICANT CHANGE UP
SPECIMEN SOURCE: SIGNIFICANT CHANGE UP

## 2020-09-16 ENCOUNTER — TRANSCRIPTION ENCOUNTER (OUTPATIENT)
Age: 35
End: 2020-09-16

## 2021-08-06 NOTE — PATIENT PROFILE ADULT. - AS SC BRADEN MOISTURE
Patient denies any nausea states ready to be discharged at this time. Discharge instructions given to pt and dad. Both verbalize understanding,deny any questions and/or concerns. Dr. Naomia Essex in to see patient. Pt transfer off unit in wheelchair w/ all belongings. Discharge Criteria    Inpatients must meet Criteria 1 through 7. All other patients are either YES or N/A. If a NO is chosen then Anesthesia or Surgeon must be notified. 1.  Minimum 30 minutes after last dose of sedative medication, minimum 120 minutes after last dose of reversal agent. Yes      2. Systolic BP stable within 20 mmHg for 30 minutes & systolic BP between 90 & 920 or within 10 mmHg of baseline. Yes      3. Pulse between 60 and 100 or within 10 bpm of baseline. Yes      4. Spontaneous respiratory rate >/= 10 per minute. Yes      5. SaO2 >/= 95 or  >/= baseline. Yes      6. Able to cough and swallow or return to baseline function. Yes      7. Alert and oriented or return to baseline mental status. Yes      8. Demonstrates controlled, coordinated movements, ambulates with steady gait, or return to baseline activity function. Yes      9. Minimal or no pain or nausea, or at a level tolerable and acceptable to patient. Yes      10. Takes and retains oral fluids as allowed. Yes      11. Procedural / perioperative site stable. Minimal or no bleeding. Yes          12. If GI endoscopy procedure, minimal or no abdominal distention or passing flatus. N/A      13. Written discharge instructions and emergency telephone number provided. Yes      14. Accompanied by a responsible adult.     Yes
(4) rarely moist

## 2022-11-07 NOTE — ED ADULT TRIAGE NOTE - ESI TRIAGE ACUITY LEVEL, MLM
Jonny Dalton presents to Urgent Care Patient arriving with: alone with complaint of cough, dry, for 1 week, gets a vasovagal response, denies any sore throat or headache, no fevers noted at this time..      Patient and visitor wearing mask? Yes    Myself mask, face shield and gloves    Can leave detailed message on   mobile phone:    Mobile [phone]      2

## 2024-09-05 NOTE — ED ADULT TRIAGE NOTE - PAIN RATING/NUMBER SCALE (0-10): REST
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 Rangel for concerns about shortness of breath and lung nodules  # Dyspnea. Normal exercise stress test (achieved 12 METs). Normal PFT. --pt reassured  # Lung nodules. Likely mucous plugs --continue monitoring: repeat CT in April 2025  # Right carotid bruit --Carotid U/S  # Health mainteance --check strep pneumo IgGs. If < 70% +, repeat pneumococcal vaccination (unclear if the last one was 5 yrs ago or more recently) --declines influenza and covid vaccines for now --will obtain RSV vaccine at local pharmacy  8

## 2024-11-19 NOTE — PATIENT PROFILE ADULT. - REASON FOR REFUSAL
"Subjective     Tricia Meza is a 18 m.o. female here with mother. Patient brought in for Well Child (Wants to look at foot at the bottom)      History of Present Illness:  Well Child Exam  Diet - WNL - Diet includes solids, cow's milk and sippy cup   Growth, Elimination, Sleep - WNL -  Voiding normal, stooling normal, sleeping normal and growth chart normal  Physical Activity - WNL - active play time  Behavior - WNL -  Development - WNL -subjective  School - normal -home with family member  Household/Safety - WNL - appropriate carseat/belt use, safe environment and support present for parents  Mchat is 0      11/19/2024    10:30 AM 8/15/2024     2:30 PM 5/16/2024     2:46 PM 2/16/2024     2:40 PM 2023     3:40 PM 2023     3:40 PM 2023     8:39 AM   Survey of Wellbeing of Young Children Milestones   Makes sounds that let you know he or she is happy or upset       Very Much   Seems happy to see you       Very Much   Follows a moving toy with his or her eyes       Somewhat   Turns head to find the person who is talking       Somewhat   Holds head steady when being pulled up to a sitting position       Somewhat   Brings hands together       Very Much   Laughs       Somewhat   Keeps head steady when held in a sitting position       Somewhat   Makes sounds like "ga," "ma," or "ba"       Somewhat   Looks when you call his or her name       Somewhat   2-Month Developmental Score Incomplete Incomplete Incomplete Incomplete Incomplete Incomplete 13   Holds head steady when being pulled up to a sitting position      Very Much    Brings hands together      Very Much    Laughs      Very Much    Keeps head steady when held in a sitting position      Very Much    Makes sounds like "ga,"  "ma," or "ba"         Very Much    Looks when you call his or her name      Very Much    Rolls over       Somewhat    Passes a toy from one hand to the other      Somewhat    Looks for you or another caregiver when " "upset      Somewhat    Holds two objects and bangs them together      Somewhat    4-Month Developmental Score Incomplete Incomplete Incomplete Incomplete Incomplete 16 Incomplete   Makes sounds like "ga", "ma", or "ba"     Somewhat     Looks when you call his or her name     Very Much     Rolls over     Very Much     Passes a toy from one hand to the other     Very Much     Looks for you or another caregiver when upset     Very Much     Holds two objects and bangs them together     Somewhat     Holds up arms to be picked up     Somewhat     Gets to a sitting position by him or herself     Not Yet     Picks up food and eats it     Not Yet     Pulls up to standing     Somewhat     6-Month Developmental Score Incomplete Incomplete Incomplete Incomplete 12 Incomplete Incomplete   Holds up arms to be picked up    Very Much      Gets to a sitting position by him or herself    Very Much      Picks up food and eats it    Somewhat      Pulls up to standing    Very Much      Plays games like "peek-a-malcolm" or "pat-a-cake"    Very Much      Calls you "mama" or "marina" or similar name    Very Much      Looks around when you say things like "Where's your bottle?" or "Where's your blanket?"    Very Much      Copies sounds that you make    Very Much      Walks across a room without help    Not Yet      Follows directions - like "Come here" or "Give me the ball"    Somewhat      9-Month Developmental Score Incomplete Incomplete Incomplete 16 Incomplete Incomplete Incomplete   Picks up food and eats it   Very Much       Pulls up to standing   Very Much       Plays games like "peek-a-malcolm" or "pat-a-cake"   Very Much       Calls you "mama" or "marina" or similar name    Very Much       Looks around when you say things like "Where's your bottle?" or "Where's your blanket?"   Somewhat       Copies sounds that you make   Very Much       Walks across a room without help   Not Yet       Follows directions - like "Come here" or "Give me the " "ball"   Somewhat       Runs   Not Yet       Walks up stairs with help   Not Yet       12-Month Developmental Score Incomplete Incomplete 12 Incomplete Incomplete Incomplete Incomplete   Calls you "mama" or "marina" or similar name  Very Much        Looks around when you say things like "Where's your bottle?" or "Where's your blanket?  Very Much        Copies sounds that you make  Very Much        Walks across a room without help  Very Much        Follows directions - like "Come here" or "Give me the ball"  Very Much        Runs  Somewhat        Walks up stairs with help  Very Much        Kicks a ball  Somewhat        Names at least 5 familiar objects - like ball or milk  Somewhat        Names at least 5 body parts - like nose, hand, or tummy  Somewhat        15-Month Developmental Score Incomplete 16 Incomplete Incomplete Incomplete Incomplete Incomplete   Runs Very Much         Walks up stairs with help Very Much         Kicks a ball Somewhat         Names at least 5 familiar objects - like ball or milk Very Much         Names at least 5 body parts - like nose, hand, or tummy Very Much         Climbs up a ladder at a playground Somewhat         Uses words like "me" or "mine" Somewhat         Jumps off the ground with two feet Not Yet         Puts 2 or more words together - like "more water" or "go outside" Somewhat         Uses words to ask for help Somewhat         18-Month Developmental Score 13 Incomplete Incomplete Incomplete Incomplete Incomplete Incomplete   24-Month Developmental Score Incomplete Incomplete Incomplete Incomplete Incomplete Incomplete Incomplete   30-Month Developmental Score Incomplete Incomplete Incomplete Incomplete Incomplete Incomplete Incomplete   36-Month Developmental Score Incomplete Incomplete Incomplete Incomplete Incomplete Incomplete Incomplete   48-Month Developmental Score Incomplete Incomplete Incomplete Incomplete Incomplete Incomplete Incomplete   60-Month Developmental Score " Incomplete Incomplete Incomplete Incomplete Incomplete Incomplete Incomplete        Review of Systems   Constitutional:  Negative for activity change, appetite change, fever and unexpected weight change.   HENT:  Negative for congestion, ear discharge, ear pain and sore throat.    Eyes:  Negative for discharge and redness.   Respiratory:  Negative for cough, wheezing and stridor.    Cardiovascular:  Negative for chest pain.   Gastrointestinal:  Negative for abdominal distention, abdominal pain, constipation and vomiting.   Endocrine: Negative for polyuria.   Genitourinary:  Negative for dysuria.   Musculoskeletal:  Negative for back pain, gait problem and neck stiffness.   Neurological:  Negative for seizures and headaches.   Psychiatric/Behavioral:  Negative for behavioral problems.           Objective     Physical Exam  Vitals and nursing note reviewed.   Constitutional:       General: She is active.      Appearance: She is well-developed.   HENT:      Right Ear: Tympanic membrane normal.      Left Ear: Tympanic membrane normal.   Eyes:      Conjunctiva/sclera: Conjunctivae normal.   Cardiovascular:      Rate and Rhythm: Regular rhythm.      Heart sounds: No murmur heard.  Pulmonary:      Effort: Pulmonary effort is normal.      Breath sounds: Normal breath sounds.   Abdominal:      Palpations: There is no mass.      Tenderness: There is no abdominal tenderness.   Lymphadenopathy:      Cervical: No cervical adenopathy.   Skin:     Findings: No rash.   Neurological:      Mental Status: She is alert.            Assessment and Plan     1. Encounter for well child check without abnormal findings    2. Need for vaccination    3. Encounter for autism screening    4. Encounter for screening for global developmental delays (milestones)        Plan:    Age appropriate physical activity and nutritional counseling were completed during today's visit.     Tricia was seen today for well child.    Diagnoses and all orders  for this visit:    Encounter for well child check without abnormal findings    Need for vaccination  -     Hep A (2-dose series) (Havrix) IM vaccine (12 mo - 17 yo)  -     influenza (Flulaval, Fluzone, Fluarix) 45 mcg/0.5 mL IM vaccine (> or = 6 mo) 0.5 mL    Encounter for autism screening  -     M-Chat- Developmental Test    Encounter for screening for global developmental delays (milestones)  -     SWYC-Developmental Test      Patient Instructions   Patient Education       Well Child Exam 18 Months   About this topic   Your child's 18-month well child exam is a visit with the doctor to check your child's health. The doctor measures your child's weight, height, and head size. The doctor plots these numbers on a growth curve. The growth curve gives a picture of your child's growth at each visit. The doctor may listen to your child's heart, lungs, and belly. Your doctor will do a full exam of your child from the head to the toes.  Your child may also need shots or blood tests during this visit.  General   Growth and Development   Your doctor will ask you how your child is developing. The doctor will focus on the skills that most children your child's age are expected to do. During this time of your child's life, here are some things you can expect.  Movement ? Your child may:  Walk up steps and run  Use a crayon to scribble or make marks  Explore places and things  Throw a ball  Begin to undress themselves  Imitate your actions  Hearing, seeing, and talking ? Your child will likely:  Have 10 or 20 words  Point to something interesting to show others  Know one body part  Point to familiar objects or characters in a book  Be able to match pairs of objects  Feeling and behavior ? Your child will likely:  Want your love and praise. Hug your child and say I love you often. Say thank you when your child does something nice.  Begin to understand no. Try to use distraction if your child is doing something you do not want  them to do.  Begin to have temper tantrums. Ignore them if possible.  Become more stubborn. Your child may shake the head no often. Try to help by giving your child words for feelings.  Play alongside other children.  Be afraid of strangers or cry when you leave.  Feeding ? Your child:  Should drink whole milk until 2 years old  Is ready to drink from a cup and may be ready to use a spoon or toddler fork  Will be eating 3 meals and 2 to 3 snacks a day. However, your child may eat less than before and this is normal.  Should be given a variety of healthy foods and textures. Let your child decide how much to eat.  Should avoid foods that might cause choking like grapes, popcorn, hot dogs, or hard candy.  Should have no more than 4 ounces (120 mL) of fruit juice a day  Will need you to clean the teeth 2 times each day with a child's toothbrush and a smear of toothpaste with fluoride in it.  Sleep ? Your child:  Should still sleep in a safe crib. Your child may be ready to sleep in a toddler bed if climbing out of the crib after naps or in the morning.  Is likely sleeping about 10 to 12 hours in a row at night  Most often takes 1 nap each day  Sleeps about a total of 14 hours each day  Should be able to fall asleep without help. If your child wakes up at night, check on your child. Do not pick your child up, offer a bottle, or play with your child. Doing these things will not help your child fall asleep without help.  Should not have a bottle in bed. This can cause tooth decay or ear infections.  Vaccines ? It is important for your child to get shots on time. This protects from very serious illnesses like lung infections, meningitis, or infections that harm the nervous system. Your child may also need a flu shot. Check with your doctor to make sure your child's shots are up to date. Your child may need:  DTaP or diphtheria, tetanus, and pertussis vaccine  IPV or polio vaccine  Hep A or hepatitis A vaccine  Hep B or  hepatitis B vaccine  Flu or influenza vaccine  Your child may get some of these combined into one shot. This lowers the number of shots your child may get and yet keeps them protected.  Help for Parents   Play with your child.  Go outside as often as you can.  Give your child pots, pans, and spoons or a toy vacuum. Children love to imitate what you are doing.  Cars, trains, and toys to push, pull, or walk behind are fun for this age child. So are puzzles and animal or people figures.  Help your child pretend. Use an empty cup to take a drink. Push a block and make sounds like it is a car or a boat.  Read to your child. Name the things in the pictures in the book. Talk and sing to your child. This helps your child learn language skills.  Give your child crayons and paper to draw or color on.  Here are some things you can do to help keep your child safe and healthy.  Do not allow anyone to smoke in your home or around your child.  Have the right size car seat for your child and use it every time your child is in the car. Your child should be rear facing until at least 2 years of age or longer.  Be sure furniture, shelves, and televisions are secure and cannot tip over and hurt your child.  Take extra care around water. Close bathroom doors. Never leave your child in the tub alone.  Never leave your child alone. Do not leave your child in the car, in the bath, or at home alone, even for a few minutes.  Avoid long exposure to direct sunlight by keeping your child in the shade. Use sunscreen if shade is not possible.  Protect your child from gun injuries. If you have a gun, use a trigger lock. Keep the gun locked up and the bullets kept in a separate place.  Avoid screen time for children under 2 years old. This means no TV, computers, or video games. They can cause problems with brain development.  Parents need to think about:  Having emergency numbers, including poison control, in your phone or posted near the  phone  How to distract your child when doing something you dont want your child to do  Using positive words to tell your child what you want, rather than saying no or what not to do  Watch for signs that your child is ready for potty training, including showing interest in the potty and staying dry for longer periods.  Your next well child visit will most likely be when your child is 4 years old. At this visit your doctor may:  Do a full check up on your child  Talk about limiting screen time for your child, how well your child is eating, and signs it may be time to start potty training  Talk about discipline and how to correct your child  Give your child the next set of shots  When do I need to call the doctor?   Fever of 100.4°F (38°C) or higher  Has trouble walking or only walks on the toes  Has trouble speaking or following simple instructions  You are worried about your child's development  Where can I learn more?   Centers for Disease Control and Prevention  https://www.cdc.gov/ncbddd/actearly/milestones/milestones-18mo.html   Last Reviewed Date   2021-09-17  Consumer Information Use and Disclaimer   This information is not specific medical advice and does not replace information you receive from your health care provider. This is only a brief summary of general information. It does NOT include all information about conditions, illnesses, injuries, tests, procedures, treatments, therapies, discharge instructions or life-style choices that may apply to you. You must talk with your health care provider for complete information about your health and treatment options. This information should not be used to decide whether or not to accept your health care providers advice, instructions or recommendations. Only your health care provider has the knowledge and training to provide advice that is right for you.  Copyright   Copyright © 2021 [employer] and its affiliates and/or licensors. All rights  reserved.    If you have an active [facility] account, please look for your well child questionnaire to come to your [facility] account before your next well child visit.  Children under the age of 2 years will be restrained in a rear facing child safety seat.          pt does not believe in it.

## 2024-12-04 NOTE — DISCHARGE NOTE ANTEPARTUM - CALL YOUR HEALTHCARE PROVIDER IF YOU ARE EXPERIENCING SYMPTOMS OF DEPRESSION THAT LAST MORE THAN THREE DAYS
[Follow-Up Visit] : a follow-up pain management visit [FreeTextEntry2] : FOLLOW UP  Statement Selected
